# Patient Record
Sex: FEMALE | Race: WHITE | HISPANIC OR LATINO | URBAN - METROPOLITAN AREA
[De-identification: names, ages, dates, MRNs, and addresses within clinical notes are randomized per-mention and may not be internally consistent; named-entity substitution may affect disease eponyms.]

---

## 2021-12-19 ENCOUNTER — HOSPITAL ENCOUNTER (EMERGENCY)
Age: 26
Discharge: LEFT BEFORE DISCHARGE | End: 2021-12-20

## 2021-12-20 VITALS
OXYGEN SATURATION: 98 % | HEART RATE: 80 BPM | RESPIRATION RATE: 12 BRPM | TEMPERATURE: 98.2 F | DIASTOLIC BLOOD PRESSURE: 100 MMHG | SYSTOLIC BLOOD PRESSURE: 147 MMHG

## 2024-07-01 ENCOUNTER — OFFICE VISIT (OUTPATIENT)
Dept: OBGYN CLINIC | Facility: CLINIC | Age: 29
End: 2024-07-01
Payer: COMMERCIAL

## 2024-07-01 VITALS
WEIGHT: 190.31 LBS | BODY MASS INDEX: 29.87 KG/M2 | DIASTOLIC BLOOD PRESSURE: 72 MMHG | SYSTOLIC BLOOD PRESSURE: 118 MMHG | HEIGHT: 67 IN

## 2024-07-01 DIAGNOSIS — Z32.01 PREGNANCY EXAMINATION OR TEST, POSITIVE RESULT (HCC): ICD-10-CM

## 2024-07-01 DIAGNOSIS — N92.6 MISSED MENSES: Primary | ICD-10-CM

## 2024-07-01 LAB
CONTROL LINE PRESENT WITH A CLEAR BACKGROUND (YES/NO): YES YES/NO
KIT LOT #: NORMAL NUMERIC
PREGNANCY TEST, URINE: POSITIVE

## 2024-07-01 RX ORDER — CHOLECALCIFEROL (VITAMIN D3) 25 MCG
1 TABLET,CHEWABLE ORAL DAILY
COMMUNITY

## 2024-07-01 NOTE — PROGRESS NOTES
[facility]  Obstetrics and Gynecology    Subjective:     Tommy Jim is a 28 year old ,female, Patient's last menstrual period was 05/15/2024. presents with missed menses and positive pregnancy test.   This is a unplanned pregnancy. Partner is involved.  Recent contraception: none  Patient's last menstrual period was 05/15/2024. Lmp certain, regular q 36 days    Menarche: 14y (2024  3:43 PM)  Period Cycle (Days): 36 days (2024  3:43 PM)  Period Duration (Days): 4-5 (2024  3:43 PM)  Period Flow: heavy and painful (2024  3:43 PM)  Use of Birth Control (if yes, specify type): OCP (2024  3:43 PM)  Date When Birth Control Last Used: 2020 (2024  3:43 PM)  Hx Prior Abnormal Pap: No (2024  3:43 PM)  Pap Date: 24 (2024  3:43 PM)  Pap Result Notes: wnl (2024  3:43 PM)      Nausea/vomiting - mild  Breast tenderness - Positive  Vaginal discharge - Positive brown  Vaginal bleeding - Negative  Urinary symptoms - Negative  Taking prenatal vitamins  Feels safe at home   STD hx: negative 3/2024    Review of Systems:  General: no complaints  Eyes: no complaints  Respiratory: no complaints  Cardiovascular: no complaints  GI: no complaints  : no complaints See HPI  Hematological/lymphatic: no complaints  Breast: no complaints  Psychiatric: no complaints  Endocrine:no complaints  Neurological: no complaints  Immunological: no complaints  Musculoskeletal:no complaints    OB History    Para Term  AB Living   0 0 0 0 0 0   SAB IAB Ectopic Multiple Live Births   0 0 0 0 0       Past Medical History:    Patient denies medical problems       Past Surgical History:   Procedure Laterality Date    Patient denies any surgical history         Social History     Socioeconomic History    Marital status:    Tobacco Use    Smoking status: Never    Smokeless tobacco: Never   Substance and Sexual Activity    Alcohol use: Not Currently    Drug use: Never     Sexual activity: Yes   Other Topics Concern    Blood Transfusions No       Family History   Problem Relation Age of Onset    Hypertension Mother     Hyperlipidemia Mother     Diabetes Mother     Diabetes Father     Hypertension Father     No Known Problems Maternal Grandmother     No Known Problems Maternal Grandfather     No Known Problems Paternal Grandmother     No Known Problems Paternal Grandfather          Current Outpatient Medications:     prenatal vitamin with DHA 27-0.8-228 MG Oral Cap, Take 1 capsule by mouth daily., Disp: , Rfl:       Health maintenance:  Health Maintenance   Topic Date Due    Annual Physical  Never done    DTaP,Tdap,and Td Vaccines (1 - Tdap) Never done    Pap Smear  Never done    COVID-19 Vaccine (1 -  season) Never done    Annual Depression Screening  Never done    Influenza Vaccine (1) 10/01/2024    Pneumococcal Vaccine: Birth to 64yrs  Aged Out        Objective:     Vitals:    24 1545   BP: 118/72   Weight: 190 lb 4.8 oz (86.3 kg)   Height: 67\"         Body mass index is 29.81 kg/m².    GENERAL: well developed, well nourished, in no apparent distress, alert and orientated X 3  PSYCH: mood and affect stable   SKIN: no rashes, no lesions  HEENT: normal  LUNGS: respiration unlabored  CARDIOVASCULAR: no peripheral edema or varicosities, skin warm and dry    ABDOMEN: Soft, non distended; non tender, no masses  GYNE/: deferred  EXTREMITIES:  Normal range of motion, strength 5/5, nontender without edema    Labs:  POC urine pregnancy test : Positive     Imaging:  ordered       Assessment:     Tommy Jim is a 28 year old  female who presents for missed menses and positive pregnancy test    There is no problem list on file for this patient.        Plan:       ICD-10-CM    1. Missed menses  N92.6       2. Pregnancy examination or test, positive result ([facility])  Z32.01           Patient's last menstrual period was 05/15/2024. at 6/5 weeks, EDC  by last menstrual  period         Missed menses  - positive pregnancy test  - US ordered  - Pt counseled on safety, diet, exercise, caffiene, tobacco, ETOH, sexual activity, ER precautions, diet, exercise, appropriate otc medication use, substance abuse   - Counseled on taking a PNV with folic acid   - genetic screening testing d/w patient and family, pt declines invasive diagnostic testing and considering cell free DNA. Discussed possible out of pocket cost of 300$.    - considering carrier screening has had carrier screening in her past for CF, SMA, and hemoglobinopathies   - advised to follow up to establish prenatal care - referred for RN education visit  - SAB precautions provided   - d/w nausea and vomiting in pregnancy including vitamin B 6 and unisom     All of the findings and plan were discussed with the patient.  She notes understanding and agrees with the plan of care.  All questions were answered to the best of my ability at this time.    RTC in 1 weeks for ultrasound, 4 wks for RN education visit and 6 wks for NOB visit    MD ELIZABETH Olvera

## 2024-07-02 ENCOUNTER — LAB ENCOUNTER (OUTPATIENT)
Dept: LAB | Facility: REFERENCE LAB | Age: 29
End: 2024-07-02
Attending: OBSTETRICS & GYNECOLOGY
Payer: COMMERCIAL

## 2024-07-02 DIAGNOSIS — Z32.01 PREGNANCY EXAMINATION OR TEST, POSITIVE RESULT (HCC): ICD-10-CM

## 2024-07-02 DIAGNOSIS — N92.6 MISSED MENSES: ICD-10-CM

## 2024-07-02 LAB — B-HCG SERPL-ACNC: ABNORMAL MIU/ML

## 2024-07-02 PROCEDURE — 84702 CHORIONIC GONADOTROPIN TEST: CPT | Performed by: OBSTETRICS & GYNECOLOGY

## 2024-07-09 ENCOUNTER — ULTRASOUND ENCOUNTER (OUTPATIENT)
Dept: OBGYN CLINIC | Facility: CLINIC | Age: 29
End: 2024-07-09
Payer: COMMERCIAL

## 2024-07-09 DIAGNOSIS — Z32.01 PREGNANCY EXAMINATION OR TEST, POSITIVE RESULT (HCC): ICD-10-CM

## 2024-07-09 DIAGNOSIS — N92.6 MISSED MENSES: ICD-10-CM

## 2024-07-09 PROCEDURE — 76817 TRANSVAGINAL US OBSTETRIC: CPT | Performed by: OBSTETRICS & GYNECOLOGY

## 2024-07-16 ENCOUNTER — PATIENT MESSAGE (OUTPATIENT)
Dept: OBGYN CLINIC | Facility: CLINIC | Age: 29
End: 2024-07-16

## 2024-07-16 NOTE — TELEPHONE ENCOUNTER
You  Tommy Estrada now (11:11 AM)     FABIOLA Sanders,      I tried to call you to ask a few more questions. Left a voice message instead, please call.     Any concerns or additional questions, please call us at .             From: Tommy Jim  To: Ольга Nicholas  Sent: 7/16/2024 11:02 AM CDT  Subject: Morning sickness     Is there anything I can take for morning sickness, I’ve tried nicolasa drops and they don’t work, I’ve tried nicolasa tea and that also doesn’t work. Is there anything else I can take ?

## 2024-07-25 ENCOUNTER — PATIENT MESSAGE (OUTPATIENT)
Dept: OBGYN CLINIC | Facility: CLINIC | Age: 29
End: 2024-07-25

## 2024-07-26 ENCOUNTER — PATIENT MESSAGE (OUTPATIENT)
Dept: OBGYN CLINIC | Facility: CLINIC | Age: 29
End: 2024-07-26

## 2024-07-29 NOTE — TELEPHONE ENCOUNTER
From: Tommy Jim  To: Ольга Nicholas  Sent: 7/26/2024 3:29 PM CDT  Subject: Doctors note     Good evening,   The past couple of weeks have been very hard on me physical and mentally. I have been having pounding headaches everyday and not being able to hold down some foods. With all this it is hard for me to get up and go to work everyday. I am planing on asking my HR to take a leave of absence for two weeks and I was wondering if I can get a doctors note in the case that they ask for one. Looking forward to hear from you.     Thank you

## 2024-08-01 ENCOUNTER — TELEPHONE (OUTPATIENT)
Dept: FAMILY MEDICINE CLINIC | Facility: CLINIC | Age: 29
End: 2024-08-01

## 2024-08-01 NOTE — TELEPHONE ENCOUNTER
Family Medical Leave Act forms received via [employer] message on 7/30/24. [employer] message sent for Authorization/Release of Information. Logged for processing.

## 2024-08-06 ENCOUNTER — NURSE ONLY (OUTPATIENT)
Dept: OBGYN CLINIC | Facility: CLINIC | Age: 29
End: 2024-08-06
Payer: COMMERCIAL

## 2024-08-06 ENCOUNTER — LAB ENCOUNTER (OUTPATIENT)
Dept: LAB | Facility: REFERENCE LAB | Age: 29
End: 2024-08-06
Attending: OBSTETRICS & GYNECOLOGY
Payer: COMMERCIAL

## 2024-08-06 DIAGNOSIS — Z34.01 ENCOUNTER FOR SUPERVISION OF NORMAL FIRST PREGNANCY IN FIRST TRIMESTER (HCC): Primary | ICD-10-CM

## 2024-08-06 DIAGNOSIS — Z34.01 ENCOUNTER FOR SUPERVISION OF NORMAL FIRST PREGNANCY IN FIRST TRIMESTER (HCC): ICD-10-CM

## 2024-08-06 LAB
ANTIBODY SCREEN: NEGATIVE
BASOPHILS # BLD AUTO: 0.02 X10(3) UL (ref 0–0.2)
BASOPHILS NFR BLD AUTO: 0.4 %
DEPRECATED RDW RBC AUTO: 37.9 FL (ref 35.1–46.3)
EOSINOPHIL # BLD AUTO: 0.05 X10(3) UL (ref 0–0.7)
EOSINOPHIL NFR BLD AUTO: 1 %
ERYTHROCYTE [DISTWIDTH] IN BLOOD BY AUTOMATED COUNT: 12.8 % (ref 11–15)
GLUCOSE 1H P GLC SERPL-MCNC: 135 MG/DL
HBV SURFACE AG SER-ACNC: 0.19 [IU]/L
HBV SURFACE AG SERPL QL IA: NONREACTIVE
HCT VFR BLD AUTO: 35.7 %
HCV AB SERPL QL IA: NONREACTIVE
HGB BLD-MCNC: 11.9 G/DL
IMM GRANULOCYTES # BLD AUTO: 0.02 X10(3) UL (ref 0–1)
IMM GRANULOCYTES NFR BLD: 0.4 %
LYMPHOCYTES # BLD AUTO: 1.05 X10(3) UL (ref 1–4)
LYMPHOCYTES NFR BLD AUTO: 20.8 %
MCH RBC QN AUTO: 27.4 PG (ref 26–34)
MCHC RBC AUTO-ENTMCNC: 33.3 G/DL (ref 31–37)
MCV RBC AUTO: 82.1 FL
MONOCYTES # BLD AUTO: 0.34 X10(3) UL (ref 0.1–1)
MONOCYTES NFR BLD AUTO: 6.7 %
NEUTROPHILS # BLD AUTO: 3.58 X10 (3) UL (ref 1.5–7.7)
NEUTROPHILS # BLD AUTO: 3.58 X10(3) UL (ref 1.5–7.7)
NEUTROPHILS NFR BLD AUTO: 70.7 %
PLATELET # BLD AUTO: 225 10(3)UL (ref 150–450)
RBC # BLD AUTO: 4.35 X10(6)UL
RH BLOOD TYPE: POSITIVE
RUBV IGG SER QL: POSITIVE
RUBV IGG SER-ACNC: 21 IU/ML (ref 10–?)
T PALLIDUM AB SER QL IA: NONREACTIVE
WBC # BLD AUTO: 5.1 X10(3) UL (ref 4–11)

## 2024-08-06 PROCEDURE — 83021 HEMOGLOBIN CHROMOTOGRAPHY: CPT | Performed by: OBSTETRICS & GYNECOLOGY

## 2024-08-06 PROCEDURE — 86803 HEPATITIS C AB TEST: CPT | Performed by: OBSTETRICS & GYNECOLOGY

## 2024-08-06 PROCEDURE — 82950 GLUCOSE TEST: CPT | Performed by: OBSTETRICS & GYNECOLOGY

## 2024-08-06 PROCEDURE — 87086 URINE CULTURE/COLONY COUNT: CPT | Performed by: OBSTETRICS & GYNECOLOGY

## 2024-08-06 PROCEDURE — 86780 TREPONEMA PALLIDUM: CPT | Performed by: OBSTETRICS & GYNECOLOGY

## 2024-08-06 PROCEDURE — 86900 BLOOD TYPING SEROLOGIC ABO: CPT | Performed by: OBSTETRICS & GYNECOLOGY

## 2024-08-06 PROCEDURE — 85025 COMPLETE CBC W/AUTO DIFF WBC: CPT | Performed by: OBSTETRICS & GYNECOLOGY

## 2024-08-06 PROCEDURE — 86762 RUBELLA ANTIBODY: CPT | Performed by: OBSTETRICS & GYNECOLOGY

## 2024-08-06 PROCEDURE — 87389 HIV-1 AG W/HIV-1&-2 AB AG IA: CPT | Performed by: OBSTETRICS & GYNECOLOGY

## 2024-08-06 PROCEDURE — 86850 RBC ANTIBODY SCREEN: CPT | Performed by: OBSTETRICS & GYNECOLOGY

## 2024-08-06 PROCEDURE — 87340 HEPATITIS B SURFACE AG IA: CPT | Performed by: OBSTETRICS & GYNECOLOGY

## 2024-08-06 PROCEDURE — 86901 BLOOD TYPING SEROLOGIC RH(D): CPT | Performed by: OBSTETRICS & GYNECOLOGY

## 2024-08-06 PROCEDURE — 83020 HEMOGLOBIN ELECTROPHORESIS: CPT | Performed by: OBSTETRICS & GYNECOLOGY

## 2024-08-06 RX ORDER — METOCLOPRAMIDE 10 MG/1
10 TABLET ORAL EVERY 6 HOURS PRN
Qty: 20 TABLET | Refills: 0 | Status: SHIPPED | OUTPATIENT
Start: 2024-08-06

## 2024-08-06 RX ORDER — WHEAT DEXTRIN 3 G/3.8 G
3 POWDER (GRAM) ORAL DAILY
COMMUNITY
Start: 2024-03-05

## 2024-08-06 RX ORDER — HYDROCORTISONE ACETATE 25 MG/1
25 SUPPOSITORY RECTAL 3 TIMES DAILY PRN
COMMUNITY
Start: 2024-03-05

## 2024-08-06 NOTE — PROGRESS NOTES
Pt is a G 1  P  0 for RN OB Education.       Pregnancy Confirmation apt with: MA    LMP: 05/15/2024    US: 2024 at 7w    Working VAUGHN:  2025    Pre  BMI:   29.81    Medical Hx significant for: anxiety, depression    Obstetrical Hx significant for: first pregnancy    Surgical Hx significant for: denies    EPDS score: 16 Efren Brown referral, called now and visit with LC tomorrow    OUD Screening: Patient has answered NO to 5p questions and has no  risk factors.     Patient given \"What Pregnant Women Need to Know\" handout.     Educational material reviewed with patient: Prenatal care, nutrition, weight gain recommendations, travel, exercise, intercourse, pregnancy changes, safe medications, pregnancy and work, fetal movement, labor and  labor, warning signs, food safety, tdap, cord blood, breastfeeding, circumcision, and Group B strep.     Pt agrees to blood transfusion if needed: yes    PN labs ordered     Optional genetic screening discussed.  Pt desires prequel and foresight     [facility] Media Policy: Reviewed and verbalized understanding.     NOB appt: tomorrow with     Lab appt: TODAY    Vaccines: informed of flu, covid, rsv and dtap    ASA protocol :  n/a    JESSICAH:  PCP Dr. Guzman, Efren Brown

## 2024-08-07 ENCOUNTER — TELEPHONE (OUTPATIENT)
Age: 29
End: 2024-08-07

## 2024-08-07 ENCOUNTER — INITIAL PRENATAL (OUTPATIENT)
Dept: OBGYN CLINIC | Facility: CLINIC | Age: 29
End: 2024-08-07
Payer: COMMERCIAL

## 2024-08-07 VITALS
DIASTOLIC BLOOD PRESSURE: 70 MMHG | BODY MASS INDEX: 28.93 KG/M2 | WEIGHT: 184.31 LBS | HEIGHT: 67 IN | SYSTOLIC BLOOD PRESSURE: 112 MMHG

## 2024-08-07 DIAGNOSIS — Z34.01 ENCOUNTER FOR SUPERVISION OF NORMAL FIRST PREGNANCY IN FIRST TRIMESTER (HCC): Primary | ICD-10-CM

## 2024-08-07 LAB
HGB A2 MFR BLD: 2.4 % (ref 1.5–3.5)
HGB PNL BLD ELPH: 97.6 % (ref 95.5–100)

## 2024-08-07 PROCEDURE — 3078F DIAST BP <80 MM HG: CPT | Performed by: OBSTETRICS & GYNECOLOGY

## 2024-08-07 PROCEDURE — 3074F SYST BP LT 130 MM HG: CPT | Performed by: OBSTETRICS & GYNECOLOGY

## 2024-08-07 PROCEDURE — 3008F BODY MASS INDEX DOCD: CPT | Performed by: OBSTETRICS & GYNECOLOGY

## 2024-08-07 NOTE — PROGRESS NOTES
29 y/o  at 11 w  She is feeling nauseous, having difficulty keeping food down. She has lost 6 lbs in pregnancy. She was using dramamine, but was given reglan yesterday  Cell free DNA pending  She has requested paperwork for [facility] 2024

## 2024-08-08 ENCOUNTER — PATIENT MESSAGE (OUTPATIENT)
Dept: OBGYN CLINIC | Facility: CLINIC | Age: 29
End: 2024-08-08

## 2024-08-08 NOTE — TELEPHONE ENCOUNTER
From: Tommy Jim  To: Syl Patel  Sent: 8/8/2024 10:46 AM CDT  Subject: FMLA     Good Morning,     I know we had spoken yesterday about my FMLA paper work. However I spoke to my HR leave of absence coordinator and since I do want to take some time off due the way I have been feeling physically and mentally. She said that if I have a letter from your office fax to her regarding my FMLA paper work in the process. She will be able to tiffani me time off. Saying the following “ Patient is under their care and medical certificates is pending and will be faxed to you soon   However please allow the patient of work for approx two weeks with a date”   [phone]

## 2024-08-08 NOTE — PROGRESS NOTES
Pt contacted,  and name verified. Pt provided lab result and msg from provider. RN provided test instructions and sent via [employer]. RN sked pt for 3-hr GTT on  at [facility]. Pt verbalized understanding and agreed with plan of care.

## 2024-08-12 ENCOUNTER — PATIENT MESSAGE (OUTPATIENT)
Dept: OBGYN CLINIC | Facility: CLINIC | Age: 29
End: 2024-08-12

## 2024-08-12 NOTE — TELEPHONE ENCOUNTER
Dr. Patel, Syl MONSIVAIS MD,    Patient is seeking continuous Family Medical Leave Act due to nausea and headaches. Start date 08/12/24-08/26/24. Do you support?    Thanks,    Yesika

## 2024-08-12 NOTE — TELEPHONE ENCOUNTER
Patient called to check status, adv still in line for processing patient stats is due 08/14/2024 asked that forms be processed as STAT and gathered the below;    Type of Leave: Continuous Family Medical Leave Act   Reason for Leave: Maternity   Start date of leave: 08/12/2024  How much time needed?: 2 wks Return to work 08/26/2024  Forms Due Date:08/14/2024- STAT   Was Fee and Turnaround info Given?: Yes     Adv patient on next steps, patient expressed understanding and knows forms will be processed as stat.

## 2024-08-12 NOTE — TELEPHONE ENCOUNTER
From: Tommy Jim  To: Ольга Nicholas  Sent: 8/12/2024 10:25 AM CDT  Subject: FMLA paper work     Good Morning,   I was just reaching out to see when my FMLA paper work will be fill out and faxed to my job. I just want to make sure it will make it to the dead line which in the 14th of this month. Looking forward to hearing from you.     Best Regards

## 2024-08-13 NOTE — TELEPHONE ENCOUNTER
Patient called for status, informed we are awaiting provider response.  Patient states form due date 8/14/24.  Delay letter sent to [employer].

## 2024-08-14 NOTE — TELEPHONE ENCOUNTER
Patient is calling requesting update on forms being signed deadline is due today. Please assist.

## 2024-08-14 NOTE — TELEPHONE ENCOUNTER
The patient called and stated that the forms department told her to call the office. Her forms were sent to Dr. Greenwood to be signed. Today is the deadline for the forms. She wanted to know if they were ready.

## 2024-08-14 NOTE — TELEPHONE ENCOUNTER
Please try to avoid signing forms in the corner as it is not visible when printing and forms are not accepted this way. Thank you!     Dr. Patel,       *The ACKNOWLEDGE button has been moved to the top right ribbon*    Please sign off on form if you agree to: Family Medical Leave Act 2 wks 08/12/2024-08/25/2024 w/ Return to work 08/26/2024  (place your signature on the first page only)    -From your Inbasket, Highlight the patient and click Chart   -Double click the 08/01/2024 Forms Completion telephone encounter  -Scroll down to the Media section   -Click the blue Hyperlink: Family Medical Leave Act Dr. Syl Patel 08/14/2024  -Click Acknowledge located in the top right ribbon/menu   -Drag the mouse into the blank space of the document and a + sign will appear. Left click to   electronically sign the document.     Thank you,  Payton HARRIS

## 2024-08-15 RX ORDER — METOCLOPRAMIDE 10 MG/1
10 TABLET ORAL EVERY 6 HOURS PRN
Qty: 30 TABLET | Refills: 0 | Status: SHIPPED | OUTPATIENT
Start: 2024-08-15 | End: 2024-08-28

## 2024-08-15 NOTE — TELEPHONE ENCOUNTER
Family Medical Leave Act forms faxed to Vivek MILLER/ KENRICK JEREZ @ [phone], sending MyChart to Adv patient. Archiving forms.

## 2024-08-16 ENCOUNTER — TELEPHONE (OUTPATIENT)
Dept: OBGYN CLINIC | Facility: CLINIC | Age: 29
End: 2024-08-16

## 2024-08-16 ENCOUNTER — MED REC SCAN ONLY (OUTPATIENT)
Dept: OBGYN CLINIC | Facility: CLINIC | Age: 29
End: 2024-08-16

## 2024-08-16 NOTE — TELEPHONE ENCOUNTER
Prequel normal and gender TBA    Called pt informed of normal prequel and gender in envelope.  Pt agrees.

## 2024-08-17 ENCOUNTER — LABORATORY ENCOUNTER (OUTPATIENT)
Dept: LAB | Facility: HOSPITAL | Age: 29
End: 2024-08-17
Attending: OBSTETRICS & GYNECOLOGY
Payer: COMMERCIAL

## 2024-08-22 ENCOUNTER — TELEPHONE (OUTPATIENT)
Dept: OBGYN CLINIC | Facility: CLINIC | Age: 29
End: 2024-08-22

## 2024-08-22 NOTE — TELEPHONE ENCOUNTER
Patient is calling requesting to speak to RN regarding a return to work note start for this upcoming Monday 8/22/2024 due to being off for two weeks. Please follow up with patient.

## 2024-08-28 RX ORDER — METOCLOPRAMIDE 10 MG/1
10 TABLET ORAL EVERY 6 HOURS PRN
Qty: 30 TABLET | Refills: 0 | Status: SHIPPED | OUTPATIENT
Start: 2024-08-28

## 2024-09-06 ENCOUNTER — TELEPHONE (OUTPATIENT)
Dept: OBGYN CLINIC | Facility: CLINIC | Age: 29
End: 2024-09-06

## 2024-09-06 PROBLEM — Z14.8 GENETIC CARRIER STATUS: Status: ACTIVE | Noted: 2024-09-06

## 2024-09-06 NOTE — TELEPHONE ENCOUNTER
Foresight carrier screen:  Congenital Adrenal Hyperplasia, [id number]  Alpha Thalassemia, HBA1/HBA2-related    LMTCB; emailed report from [facility]

## 2024-09-08 PROBLEM — Z34.00: Status: ACTIVE | Noted: 2024-08-07

## 2024-09-09 ENCOUNTER — ROUTINE PRENATAL (OUTPATIENT)
Dept: OBGYN CLINIC | Facility: CLINIC | Age: 29
End: 2024-09-09
Payer: COMMERCIAL

## 2024-09-09 ENCOUNTER — LAB ENCOUNTER (OUTPATIENT)
Dept: LAB | Facility: REFERENCE LAB | Age: 29
End: 2024-09-09
Attending: OBSTETRICS & GYNECOLOGY
Payer: COMMERCIAL

## 2024-09-09 VITALS
DIASTOLIC BLOOD PRESSURE: 72 MMHG | SYSTOLIC BLOOD PRESSURE: 114 MMHG | HEIGHT: 67 IN | BODY MASS INDEX: 28.61 KG/M2 | WEIGHT: 182.31 LBS

## 2024-09-09 DIAGNOSIS — Z34.00 ENCNTR FOR SUPRVSN OF NORMAL FIRST PREGNANCY, UNSP TRIMESTER (HCC): Primary | ICD-10-CM

## 2024-09-09 PROCEDURE — 82105 ALPHA-FETOPROTEIN SERUM: CPT | Performed by: OBSTETRICS & GYNECOLOGY

## 2024-09-09 RX ORDER — FAMOTIDINE 20 MG/1
20 TABLET, FILM COATED ORAL 2 TIMES DAILY PRN
Qty: 20 TABLET | Refills: 1 | Status: SHIPPED | OUTPATIENT
Start: 2024-09-09

## 2024-09-09 NOTE — PROGRESS NOTES
Denies pain, bleeding, LOF.   No fetal movement yet   28 year old  at 15w6d      Return OB  Pre- Care: UTD. AFP and ultrasound orders placed.  Reviewed positive carrier screen - desires partner testing today.      Patient Active Problem List   Diagnosis    Encntr for suprvsn of normal first pregnancy, unsp trimester (HCC)    Genetic carrier status - Congenital Adrenal Hyperplasia, [id number] Alpha Thalassemia, HBA1/HBA2-related; partner testing sent        - Return to clinic in: 4

## 2024-09-11 NOTE — TELEPHONE ENCOUNTER
Called pt informed of partner testing, [employer] genetics counselor.  Pt agrees.    Per pt was made aware of partner needing carrier screening and it was done on Monday.

## 2024-09-13 LAB
AFP MOM: 1.3
AFP VALUE: 37.5 NG/ML
GA ON COLL DATE: 15.9 WEEKS
INSULIN DEP AFP: NO
MAT AGE AT EDD: 30.3 YR
MULTIPLE GEST AFP: NO
OSBR RISK 1 IN AFP: 4803
WEIGHT AFP: 182 LBS

## 2024-09-25 ENCOUNTER — TELEPHONE (OUTPATIENT)
Dept: OBGYN CLINIC | Facility: CLINIC | Age: 29
End: 2024-09-25

## 2024-09-25 NOTE — TELEPHONE ENCOUNTER
Congenital Adrenal Hyperplasia, [id number] Partner negative- 2024  Alpha Thalassemia, HBA1/HBA2-related Partner negative- 2024    Called pt informed of partner testing negative and agrees.

## 2024-10-07 ENCOUNTER — ROUTINE PRENATAL (OUTPATIENT)
Dept: OBGYN CLINIC | Facility: CLINIC | Age: 29
End: 2024-10-07
Payer: COMMERCIAL

## 2024-10-07 ENCOUNTER — ULTRASOUND ENCOUNTER (OUTPATIENT)
Dept: OBGYN CLINIC | Facility: CLINIC | Age: 29
End: 2024-10-07
Payer: COMMERCIAL

## 2024-10-07 VITALS
BODY MASS INDEX: 30.13 KG/M2 | HEIGHT: 67 IN | DIASTOLIC BLOOD PRESSURE: 82 MMHG | WEIGHT: 192 LBS | SYSTOLIC BLOOD PRESSURE: 118 MMHG

## 2024-10-07 DIAGNOSIS — Z34.00 SUPERVISION OF NORMAL FIRST PREGNANCY, ANTEPARTUM (HCC): Primary | ICD-10-CM

## 2024-10-07 DIAGNOSIS — Z34.02 ENCOUNTER FOR SUPERVISION OF NORMAL FIRST PREGNANCY IN SECOND TRIMESTER (HCC): Primary | ICD-10-CM

## 2024-10-07 NOTE — PROGRESS NOTES
Denies pain, bleeding, LOF.   No fetal movement yet     28 year old  at 19w6d      Return OB  Pre-Clemente Care: UTD.   - anatomy US today WNL  - flu shot today      Patient Active Problem List   Diagnosis    Encntr for suprvsn of normal first pregnancy, unsp trimester (HCC)    Carrier of Congenital Adrenal Hyperplasia, [id number] Alpha Thalassemia, HBA1/HBA2-related; partner is negative       - Return to clinic in: 4 wks    Rhonda Felix,

## 2024-11-06 ENCOUNTER — ROUTINE PRENATAL (OUTPATIENT)
Dept: OBGYN CLINIC | Facility: CLINIC | Age: 29
End: 2024-11-06
Payer: COMMERCIAL

## 2024-11-06 VITALS
SYSTOLIC BLOOD PRESSURE: 108 MMHG | HEIGHT: 67 IN | DIASTOLIC BLOOD PRESSURE: 60 MMHG | BODY MASS INDEX: 31.23 KG/M2 | WEIGHT: 199 LBS

## 2024-11-06 DIAGNOSIS — O99.810 ABNORMAL MATERNAL GLUCOSE TOLERANCE, ANTEPARTUM (HCC): Primary | ICD-10-CM

## 2024-11-06 PROCEDURE — 3074F SYST BP LT 130 MM HG: CPT | Performed by: OBSTETRICS & GYNECOLOGY

## 2024-11-06 PROCEDURE — 3078F DIAST BP <80 MM HG: CPT | Performed by: OBSTETRICS & GYNECOLOGY

## 2024-11-06 PROCEDURE — 3008F BODY MASS INDEX DOCD: CPT | Performed by: OBSTETRICS & GYNECOLOGY

## 2024-11-06 NOTE — PROGRESS NOTES
RETURN OB VISIT    Tommy Jim is a 29 year old  at 24w1d presenting for return OB visit. Today she reports no contractions, vaginal bleeding or leaking fluid. Starting to feel fetal movement. Some bilateral ankle swelling yesterday but reports did not walk very much. Improved with elevating her feet overnight. No asymmetry or tenderness on exam, now suspicion for VTE.     Repeat 3hr GTT ordered, will complete when fasting    Follow up in 4wk    Era Sanchez DO

## 2024-12-07 ENCOUNTER — LAB ENCOUNTER (OUTPATIENT)
Dept: LAB | Facility: HOSPITAL | Age: 29
End: 2024-12-07
Attending: OBSTETRICS & GYNECOLOGY
Payer: COMMERCIAL

## 2024-12-07 DIAGNOSIS — O99.810 ABNORMAL MATERNAL GLUCOSE TOLERANCE, ANTEPARTUM (HCC): Primary | ICD-10-CM

## 2024-12-07 LAB
GLUCOSE 1H P GLC SERPL-MCNC: 144 MG/DL
GLUCOSE 2H P GLC SERPL-MCNC: 119 MG/DL
GLUCOSE 3H P GLC SERPL-MCNC: 97 MG/DL (ref 70–140)
GLUCOSE P FAST SERPL-MCNC: 79 MG/DL

## 2024-12-07 PROCEDURE — 82951 GLUCOSE TOLERANCE TEST (GTT): CPT | Performed by: OBSTETRICS & GYNECOLOGY

## 2024-12-07 PROCEDURE — 82952 GTT-ADDED SAMPLES: CPT | Performed by: OBSTETRICS & GYNECOLOGY

## 2024-12-09 ENCOUNTER — ROUTINE PRENATAL (OUTPATIENT)
Dept: OBGYN CLINIC | Facility: CLINIC | Age: 29
End: 2024-12-09
Payer: COMMERCIAL

## 2024-12-09 VITALS
WEIGHT: 204 LBS | HEIGHT: 67 IN | DIASTOLIC BLOOD PRESSURE: 68 MMHG | BODY MASS INDEX: 32.02 KG/M2 | SYSTOLIC BLOOD PRESSURE: 108 MMHG

## 2024-12-09 DIAGNOSIS — Z34.03 ENCOUNTER FOR SUPERVISION OF NORMAL FIRST PREGNANCY IN THIRD TRIMESTER (HCC): Primary | ICD-10-CM

## 2024-12-09 DIAGNOSIS — N89.8 VAGINAL DISCHARGE: ICD-10-CM

## 2024-12-09 PROCEDURE — 81514 NFCT DS BV&VAGINITIS DNA ALG: CPT | Performed by: OBSTETRICS & GYNECOLOGY

## 2024-12-09 NOTE — PROGRESS NOTES
Doing well. No OB complaints. +FM.   Noted having increased swelling and back pains after working around the home and took a day or 2 off of work. Agreed with pregnancy related intermittent leave for pregnancy related symptoms for 1-2 days per month.   Letter provided.   Noted abnormal vaginal odor.   Recommend Vaginitis swab. Collected with MA chaperone present.   TDAP today.   Passed 3h GTT.   Reviewed baby mindfulness for fetal movements. Call if changes/present to OB triage.     JON 2 weeks.     Dr. Joseluis Rodriguez MD    [facility]     This note was created by [employer] voice recognition. Errors in content may be related to improper recognition by the system; efforts to review and correct have been done but errors may still exist. Please be advised the primary purpose of this note is for me to communicate medical care. Standard sentence structure is not always used. Medical terminology and medical abbreviations may be used. There may be grammatical, typographical, and automated fill ins that may have errors missed in proofreading.

## 2024-12-10 LAB
BV BACTERIA DNA VAG QL NAA+PROBE: NEGATIVE
C GLABRATA DNA VAG QL NAA+PROBE: NEGATIVE
C KRUSEI DNA VAG QL NAA+PROBE: NEGATIVE
CANDIDA DNA VAG QL NAA+PROBE: NEGATIVE
T VAGINALIS DNA VAG QL NAA+PROBE: NEGATIVE

## 2024-12-24 ENCOUNTER — ROUTINE PRENATAL (OUTPATIENT)
Dept: OBGYN CLINIC | Facility: CLINIC | Age: 29
End: 2024-12-24
Payer: COMMERCIAL

## 2024-12-24 VITALS
DIASTOLIC BLOOD PRESSURE: 78 MMHG | SYSTOLIC BLOOD PRESSURE: 108 MMHG | BODY MASS INDEX: 31.86 KG/M2 | WEIGHT: 203 LBS | HEIGHT: 67 IN

## 2024-12-24 DIAGNOSIS — Z34.00 ENCNTR FOR SUPRVSN OF NORMAL FIRST PREGNANCY, UNSP TRIMESTER (HCC): Primary | ICD-10-CM

## 2024-12-24 PROCEDURE — 3078F DIAST BP <80 MM HG: CPT | Performed by: OBSTETRICS & GYNECOLOGY

## 2024-12-24 PROCEDURE — 3074F SYST BP LT 130 MM HG: CPT | Performed by: OBSTETRICS & GYNECOLOGY

## 2024-12-24 PROCEDURE — 3008F BODY MASS INDEX DOCD: CPT | Performed by: OBSTETRICS & GYNECOLOGY

## 2024-12-24 NOTE — PROGRESS NOTES
at 31 W  Check usn at 32 W  DW her benefits of TDAP  Had episode where she was seeing spots on L eye last weekend. Recommend call if recurs.   Has to walk around a lot at her job and would like restrictions Letter generated

## 2025-01-06 ENCOUNTER — HOSPITAL ENCOUNTER (OUTPATIENT)
Dept: PERINATAL CARE | Facility: HOSPITAL | Age: 30
Discharge: HOME | End: 2025-01-06
Attending: OBSTETRICS & GYNECOLOGY
Payer: COMMERCIAL

## 2025-01-06 ENCOUNTER — HOSPITAL ENCOUNTER (OUTPATIENT)
Facility: HOSPITAL | Age: 30
Setting detail: OBSERVATION
Discharge: HOME | End: 2025-01-08
Attending: OBSTETRICS & GYNECOLOGY | Admitting: OBSTETRICS & GYNECOLOGY
Payer: COMMERCIAL

## 2025-01-06 ENCOUNTER — PATIENT MESSAGE (OUTPATIENT)
Dept: OBGYN CLINIC | Facility: CLINIC | Age: 30
End: 2025-01-06

## 2025-01-06 DIAGNOSIS — N63.21 LUMP IN UPPER OUTER QUADRANT OF LEFT BREAST: Primary | ICD-10-CM

## 2025-01-06 DIAGNOSIS — O09.219 HX OF PTL (PRETERM LABOR), CURRENT PREGNANCY (HCC): Primary | ICD-10-CM

## 2025-01-06 LAB
BILIRUB UR QL: NEGATIVE
COLOR UR: YELLOW
GLUCOSE UR-MCNC: NORMAL MG/DL
KETONES UR-MCNC: NEGATIVE MG/DL
LEUKOCYTE ESTERASE UR QL STRIP.AUTO: 250
NITRITE UR QL STRIP.AUTO: NEGATIVE
PH UR: 6.5 [PH] (ref 5–8)
PROT UR-MCNC: 20 MG/DL
RBC #/AREA URNS AUTO: >10 /HPF
SP GR UR STRIP: 1.02 (ref 1–1.03)
UROBILINOGEN UR STRIP-ACNC: NORMAL

## 2025-01-06 PROCEDURE — 76819 FETAL BIOPHYS PROFIL W/O NST: CPT

## 2025-01-06 PROCEDURE — 99214 OFFICE O/P EST MOD 30 MIN: CPT | Performed by: OBSTETRICS & GYNECOLOGY

## 2025-01-06 PROCEDURE — 76818 FETAL BIOPHYS PROFILE W/NST: CPT

## 2025-01-06 PROCEDURE — 76816 OB US FOLLOW-UP PER FETUS: CPT | Performed by: OBSTETRICS & GYNECOLOGY

## 2025-01-06 RX ORDER — BETAMETHASONE SODIUM PHOSPHATE AND BETAMETHASONE ACETATE 3; 3 MG/ML; MG/ML
12 INJECTION, SUSPENSION INTRA-ARTICULAR; INTRALESIONAL; INTRAMUSCULAR; SOFT TISSUE EVERY 24 HOURS
Status: COMPLETED | OUTPATIENT
Start: 2025-01-06 | End: 2025-01-07

## 2025-01-06 RX ORDER — SODIUM CHLORIDE, SODIUM LACTATE, POTASSIUM CHLORIDE, CALCIUM CHLORIDE 600; 310; 30; 20 MG/100ML; MG/100ML; MG/100ML; MG/100ML
INJECTION, SOLUTION INTRAVENOUS CONTINUOUS
Status: DISCONTINUED | OUTPATIENT
Start: 2025-01-06 | End: 2025-01-08

## 2025-01-06 RX ORDER — BETAMETHASONE SODIUM PHOSPHATE AND BETAMETHASONE ACETATE 3; 3 MG/ML; MG/ML
INJECTION, SUSPENSION INTRA-ARTICULAR; INTRALESIONAL; INTRAMUSCULAR; SOFT TISSUE
Status: COMPLETED
Start: 2025-01-06 | End: 2025-01-06

## 2025-01-06 NOTE — PROGRESS NOTES
Pt is a 29 year old female admitted to TR3/TR3-A.     Chief Complaint   Patient presents with    Vaginal Bleeding     0900 and cramping at 0800 that has gotten less severe per patient. Bleeding noted on pts underwear and pt states bright red blood when wiping. +FM and denies LOF. No tenderness to abdominal palpation      Pt is  32w6d intra-uterine pregnancy.  History obtained, consents signed. Oriented to room, staff, and plan of care.

## 2025-01-06 NOTE — PROGRESS NOTES
Pt is a 29 year old female admitted to 375/375-A.     Chief Complaint   Patient presents with    Vaginal Bleeding     0900 and cramping at 0800 that has gotten less severe per patient. Bleeding noted on pts underwear and pt states bright red blood when wiping. +FM and denies LOF. No tenderness to abdominal palpation      Pt is  32w6d intra-uterine pregnancy.  History obtained, consents signed. Oriented to room, staff, and plan of care.

## 2025-01-06 NOTE — CONSULTS
[facility]  Non-Surgical Consult    Tommy Jim Patient Status:  Outpatient    10/14/1995 MRN [id number]   Location [facility] Attending Syl Patel*   Hosp Day # 0 PCP No primary care provider on file.     SUBJECTIVE:  Reason for Admission:   contractions and vaginal bleeding    History of Present Illness:  Patient is a 29 year old female.    Patient's last menstrual period was 05/15/2024.  Woke up with spotting of blood this morning and starting having regular cramping.  Denies prenatal complications.  No trauma.  Was examined by Dr. Greenwood and dilated 1 cm.    Medications:  Prescriptions Prior to Admission[1]    Allergies:  Allergies[2]     Past Medical History:  Past Medical History:    Anxiety    Depression    Patient denies medical problems       Past Surgical History:  Past Surgical History:   Procedure Laterality Date    Patient denies any surgical history         Past OB History:  OB History    Para Term  AB Living   1 0 0 0 0 0   SAB IAB Ectopic Multiple Live Births   0 0 0 0 0      # Outcome Date GA Lbr Gume/2nd Weight Sex Type Anes PTL Lv   1 Current                    Social History:  Social History     Tobacco Use    Smoking status: Never    Smokeless tobacco: Never   Substance Use Topics    Alcohol use: Not Currently        Review of Systems:  Unremarkable except as above    OBJECTIVE:  Temp:  [98 °F (36.7 °C)-98.2 °F (36.8 °C)] 98.2 °F (36.8 °C)  Pulse:  [83-88] 83  Resp:  [16] 16  BP: (124-136)/(79-82) 136/82  No intake or output data in the 24 hours ending 25 1507    Physical Exam:  General appearance: alert, appears stated age, cooperative, and no distress  Heart: regular rate and rhythm  Abdomen: soft, non-tender; bowel sounds normal; no masses,  no organomegaly  irregular contractions  Extremities: extremities normal, atraumatic, no cyanosis or edema  Neurologic: Grossly normal    Diagnostics:    OB ULTRASOUND  REPORT   See imaging tab for complete ultrasound report or in PACS    Single IUP in cephalic presentation.    Placenta is anterior.   A 3 vessel cord is noted.  Cardiac activity is present at 138 bpm  EFW 2736 g ( 6 lb 1 oz); 85%.    MVP is 3.9 cm . VINCENT 11.4 cm        BIOPHYSICAL PROFILE:  Movement:    2/2  Tone:            2/2  Breathin/2  Fluid:             2/2  TOTAL:             ==================================================    DISCUSSION  During her visit we discussed and reviewed the following issues:    We discussed the morbidity and mortality associated with prematurity at various gestational ages.  The signs and symptoms of  labor were discussed.  We talked about potential risks and benefits associated with tocolytic therapy and  steroid.       ASSESSMENT:  IUP at 32w6d    labor    PLAN:  Agree with current plan of IV hydration, Betamethasone and antibiotics.  If contractions continue or change in cervix then consider tocolysis.      Thank you for allowing me to participate in the care of your patient.  Please do not hesitate to call with any questions or concerns.     Total floor time was 45 minutes in evaluation, consultation, and coordination of care.  Greater than 50% of this time was spent in face to face discussion with the patient.    Discussed with Dr. Greenwood.    Neptali Elaine D.O.  2025  3:07 PM           [1]   Medications Prior to Admission   Medication Sig Dispense Refill Last Dose/Taking    prenatal vitamin with DHA 27-0.8-228 MG Oral Cap Take 1 capsule by mouth daily.   2025    famotidine (PEPCID AC MAXIMUM STRENGTH) 20 MG Oral Tab Take 1 tablet (20 mg total) by mouth 2 (two) times daily as needed for Heartburn. 20 tablet 1     metoclopramide (REGLAN) 10 MG Oral Tab Take 1 tablet (10 mg total) by mouth every 6 (six) hours as needed (nausea and/or vomiting). (Patient not taking: Reported on 2024) 30 tablet 0     hydrocortisone 2.5 % External  Cream Place rectally.       hydrocortisone 25 MG Rectal Suppos Place 1 suppository (25 mg total) rectally 3 (three) times daily as needed.       Wheat Dextrin (BENEFIBER) Oral Powder Take 3 g by mouth daily.      [2] No Known Allergies

## 2025-01-06 NOTE — H&P
GYN H&P     2025  12:14 PM    CC: Patient is here for painful contractions    HPI: Patient is a 29 year old  for painful contractions with light vaginal bleeding starting this AM.     + FM, no LOF. No urinary c/o    Pregnancy uncomplicated.         Patient's last menstrual period was 05/15/2024.    OB History    Para Term  AB Living   1 0 0 0 0 0   SAB IAB Ectopic Multiple Live Births   0 0 0 0 0      # Outcome Date GA Lbr Gume/2nd Weight Sex Type Anes PTL Lv   1 Current                GYN hx:           Past Medical History:    Anxiety    Depression    Patient denies medical problems     Past Surgical History:   Procedure Laterality Date    Patient denies any surgical history       Allergies[1]  Family History   Problem Relation Age of Onset    Diabetes Father     Hypertension Father     Hypertension Mother     Hyperlipidemia Mother     Diabetes Mother     No Known Problems Maternal Grandmother     No Known Problems Maternal Grandfather     No Known Problems Paternal Grandmother     No Known Problems Paternal Grandfather     No Known Problems Brother     Depression Sister      Social History     Socioeconomic History    Marital status:    Tobacco Use    Smoking status: Never    Smokeless tobacco: Never   Substance and Sexual Activity    Alcohol use: Not Currently    Drug use: Never    Sexual activity: Yes     Social History     Social History Narrative    Feel safe       Medications reviewed. See active list.     /79 (BP Location: Left arm)   Pulse 88   Temp 98 °F (36.7 °C) (Oral)   Resp 16   Wt 204 lb (92.5 kg)   LMP 05/15/2024   BMI 31.95 kg/m²       Exam:   FHT's 150 category 1  Ucx 1 2 - 3  GENERAL: well developed, well nourished, in no apparent distress  SKIN: no rashes, no suspicious lesions  ABDOMEN: gravod  GYNE/:  External Genitalia: Normal appearing, no lesions, normal hair distribution   Urethral meatus appear wnl, no abnormal discharge or lesions noted.    Bladder: well supported, urethra wnl, no palpable tenderness or masses, no discharge, no fluid, no blood in vault  Vagina: normal pink mucosa, no lesions, normal clear discharge.   Cervix: pink, no lesions grossly visible, no discharge  SVE 50/-3    Component      Latest Ref Rng 2025   Color Urine      Yellow  Yellow    Clarity Urine      Clear  Turbid !    Spec Gravity      1.005 - 1.030  1.020    Glucose Urine      Normal mg/dL Normal    Bilirubin Urine      Negative  Negative    Ketones, UA      Negative mg/dL Negative    Blood Urine      Negative  3+ !    PH Urine      5.0 - 8.0  6.5    Protein Urine      Negative mg/dL 20 !    Urobilinogen Urine      Normal  Normal    Nitrite Urine      Negative  Negative    Leukocyte Esterase       Negative  250 !    WBC Urine      0 - 5 /HPF 6-10 !    RBC Urine      0 - 2 /HPF >10 !    Bacteria Urine      None Seen /HPF 1+ !    SQUAM EPI CELLS UR      None Seen /HPF Few !    RENAL TUBULAR EPITHELIAL CELLS      None Seen /HPF None Seen    TRANSITIONAL EPI CELLS      None Seen /HPF None Seen    YEAST URINE      None Seen /HPF None Seen       Legend:  ! Abnormal    A/P: Patient is 29 year old female  at 32 6/7 w with  labor. Will begin IV hydration and steroids. May require tocolysis. Will consult [facility]    UTI - start ancef.     Syl Patel MD            [1] No Known Allergies

## 2025-01-06 NOTE — TELEPHONE ENCOUNTER
Called pt c/o period cramping today started at 8am, PS 4-5/10 cramps, specks turning to spots. Based upon informed advised to go to [facility].  Pt agrees.    Paged Dr. Greenwood and informed    Called [facility], report to Annel TY

## 2025-01-07 ENCOUNTER — TELEPHONE (OUTPATIENT)
Dept: OBGYN CLINIC | Facility: CLINIC | Age: 30
End: 2025-01-07

## 2025-01-07 LAB
BV BACTERIA DNA VAG QL NAA+PROBE: NEGATIVE
C GLABRATA DNA VAG QL NAA+PROBE: NEGATIVE
C KRUSEI DNA VAG QL NAA+PROBE: NEGATIVE
C TRACH DNA SPEC QL NAA+PROBE: NEGATIVE
CANDIDA DNA VAG QL NAA+PROBE: NEGATIVE
N GONORRHOEA DNA SPEC QL NAA+PROBE: NEGATIVE
T VAGINALIS DNA VAG QL NAA+PROBE: NEGATIVE

## 2025-01-07 NOTE — PROGRESS NOTES
[facility] ([place])  OB/GYN: Antepartum Progress Note     SUBJECTIVE:  Pt is a 29 year old  female at 33w0d who was admitted on 2025 for  contractions.     feels better today.  Fetal Movement: regular. Vaginal Bleeding: some light pink discharge. Contractions: rare. Leakage of Fluid: denies    OBJECTIVE:  Vital signs in last 24 hours:  Temp:  [97.9 °F (36.6 °C)-98.8 °F (37.1 °C)] 98.2 °F (36.8 °C)  Pulse:  [83-95] 95  Resp:  [16] 16  BP: (109-136)/(56-82) 115/56  Temps:   Temp Readings from Last 3 Encounters:   25 98.2 °F (36.8 °C) (Oral)     Maximum Temperature: Temp (24hrs), Av.2 °F (36.8 °C), Min:97.9 °F (36.6 °C), Max:98.8 °F (37.1 °C)     BPs:  BP Readings from Last 3 Encounters:   25 115/56   24 108/78   24 108/68     Weights:  Wt Readings from Last 3 Encounters:   25 204 lb (92.5 kg)   24 203 lb (92.1 kg)   24 204 lb (92.5 kg)       Intake/Output:  Totals for last 24 hours:   No intake or output data in the 24 hours ending 25 1053    Uterus: nontender  Extremities: SCDs in place, no calf tenderness bilaterally    Uterine Contraction: irregular but q 8-12 minutes   FHT: reactive NST    Cervix: /50-3      ASSESSMENT/PLAN:  Pt is a 29 year old  female at 33w0d who was admitted on 2025 for  contractions.   Hospital Day 0    Active Problems:  Patient Active Problem List   Diagnosis    Encntr for suprvsn of normal first pregnancy, unsp trimester (HCC)    Carrier of Congenital Adrenal Hyperplasia, [id number] Alpha Thalassemia, HBA1/HBA2-related; partner is negative      contractions:  - continues to have contractions, not painful  - cervix unchanged from admisssion  - MFM on consult.  - 2nd dose of BTMZ @ noon today. Consider DC home tomorrow if no additional cervical change.    UTI  - s/p ancef yesterday, should treat UTI in pregnancy.      Rhonda Felix, DO

## 2025-01-07 NOTE — TELEPHONE ENCOUNTER
Received incomplete Family Medical Leave Act form via email and a valid Release of Information form for [place] Leave of Absence signed on 12/24/24. Scanned Release of Information into patients chart. Logged for processing.

## 2025-01-07 NOTE — TELEPHONE ENCOUNTER
Received another email with incomplete Family Medical Leave Act forms. Moved to filling cabinet box.

## 2025-01-08 VITALS
WEIGHT: 204 LBS | DIASTOLIC BLOOD PRESSURE: 62 MMHG | HEART RATE: 98 BPM | HEIGHT: 67 IN | RESPIRATION RATE: 16 BRPM | SYSTOLIC BLOOD PRESSURE: 108 MMHG | TEMPERATURE: 99 F | BODY MASS INDEX: 32.02 KG/M2

## 2025-01-08 PROBLEM — O47.00 PRETERM CONTRACTIONS (HCC): Status: ACTIVE | Noted: 2025-01-08

## 2025-01-08 RX ORDER — HYDROCORTISONE 25 MG/G
1 CREAM TOPICAL DAILY
Qty: 28 G | Refills: 1 | Status: SHIPPED | OUTPATIENT
Start: 2025-01-08

## 2025-01-08 NOTE — DISCHARGE SUMMARY
[facility]  part of [facility]    Discharge Summary    Tommy Jim Patient Status:  Outpatient    10/14/1995 MRN [id number]   Location [facility] Attending Joseluis Rodriguez MD   Hosp Day # 0 PCP No primary care provider on file.     Admit Date: 2025    Discharge Date : 25    Attending Physician: Joseluis Rodriguez MD    Reason for Admission:   contractions.     Procedures:  None.   Given IM betamethasone course on  and .     Hospital Course: Patient was admitted with painful contractions. SVE was 1/70/-2. She was started on antibiotics and given a betamethasone course. Contractions stopped. SVE was unchanged on  and . She finished the betamethasone course receiving 2 doses. She was stable for discharge home on 25 after noting no contractions and her SVE was unchanged at 1/70/-3.       Discharge Diagnoses: There were no encounter diagnoses.    Discharged Condition: good    Disposition: home    Patient Instructions:  Follow-up appointment with Dr. Rodriguez in 2 weeks.    Joseluis Rodriguez MD  2025  8:50 AM

## 2025-01-08 NOTE — PROGRESS NOTES
[facility] ([place])  OB/GYN: Antepartum Progress Note     SUBJECTIVE:  Pt is a 29 year old  female at 33w1d who was admitted on 2025 for  contractions. Patient reports feeling well. No contractions or tightness. Feels one side of her uterus where the baby is resting as harder.     Fetal Movement: +. Vaginal Bleeding: no. Contractions: no. Leakage of Fluid: no    Feels like she has a new lump around her anus.     Also feels a new lump in her left breast upper outer quadrant.     OBJECTIVE:  Vital signs in last 24 hours:  Temp:  [98.2 °F (36.8 °C)-98.9 °F (37.2 °C)] 98.6 °F (37 °C)  Pulse:  [] 86  Resp:  [16-18] 16  BP: (123-134)/(59-72) 124/59  Temps:   Temp Readings from Last 3 Encounters:   25 98.6 °F (37 °C) (Oral)     Maximum Temperature: Temp (24hrs), Av.5 °F (36.9 °C), Min:98.2 °F (36.8 °C), Max:98.9 °F (37.2 °C)     BPs:  BP Readings from Last 3 Encounters:   25 124/59   24 108/78   24 108/68     Weights:  Wt Readings from Last 3 Encounters:   25 204 lb (92.5 kg)   24 203 lb (92.1 kg)   24 204 lb (92.5 kg)       Intake/Output:  Totals for last 24 hours:   No intake or output data in the 24 hours ending 25 0754    Uterus: nontender  Extremities: No calf tenderness bilaterally    Last NST was 25 at 0300:   Uterine Contraction: noted 1 contraction in 30 min.   NST:baseline 125/ moderate bryan/ + accels/ no decels    SVE: 70/-3. Perineum with small < 1 cm non thrombosed hemorrhoid versus perineal skin engorgement.       ASSESSMENT/PLAN:  Pt is a 29 year old  female at 33w1d who was admitted on 2025 for  contractions.   Hospital Day 0    Active Problems:  Patient Active Problem List   Diagnosis    Encntr for suprvsn of normal first pregnancy, unsp trimester (HCC)    Carrier of Congenital Adrenal Hyperplasia, [id number] Alpha Thalassemia, HBA1/HBA2-related; partner is negative       contractions  - Completed BMTZ course.   - Contractions dissipated.   - SVE unchanged.   - Complete NST then discharge home.   - Recommend discharge home with labor precautions.   - Anusol daily Prescription sent.   - Recommend left breast ultrasound. Patient to schedule.   - Follow up JON visit 1-2 weeks.       Dr. Joseluis Rodriguez MD    [facility]     This note was created by Dragon voice recognition. Errors in content may be related to improper recognition by the system; efforts to review and correct have been done but errors may still exist. Please be advised the primary purpose of this note is for me to communicate medical care. Standard sentence structure is not always used. Medical terminology and medical abbreviations may be used. There may be grammatical, typographical, and automated fill ins that may have errors missed in proofreading.

## 2025-01-08 NOTE — PROGRESS NOTES
Discharge order received from MD. Pt in stable condition. Discharge instructions given regarding PTL signs, preeclampsia signs and symptoms, MD followup, and pain medications. Mom escorted self to vehicle.

## 2025-01-08 NOTE — DISCHARGE INSTRUCTIONS
Please call your OBGYN if you are:  having painful contractions that are 5-10 mins apart or closer  vaginal bleeding  leaking fluid  F/U with OB for routine prenatal  Schedule US for L breast  Use steroid cream topically as needed once a day for hemorrhoids

## 2025-01-08 NOTE — TELEPHONE ENCOUNTER
Dr. Patel,     Please sign off on form if you agree to:   Family Medical Leave Act; maternity leave  -Signature page will be the first page scanned  -From your Inbasket, Highlight the patient and click Chart   -Double click the 01/07/25 Forms Completion telephone encounter  -Scroll down to the Media section   -Click the blue Hyperlink: Dr.Chorzempa-Schainis Laine, 1/8/25  -Click Acknowledge located in the top right ribbon/menu   -Drag the mouse into the blank space of the document and a + sign will appear. Left click to   electronically sign the document.  -Once signed, simply exit out of the screen and you signature will be saved.     Thank you so much for your time,   Jon

## 2025-01-08 NOTE — PLAN OF CARE
Problem: Patient Centered Care  Goal: Patient preferences are identified and integrated in the patient's plan of care  Description: Interventions:  - What would you like us to know as we care for you? It's a girl!  - Provide timely, complete, and accurate information to patient/family  - Incorporate patient and family knowledge, values, beliefs, and cultural backgrounds into the planning and delivery of care  - Encourage patient/family to participate in care and decision-making at the level they choose  - Honor patient and family perspectives and choices  Outcome: Completed     Problem: Patient/Family Goals  Goal: Patient/Family Long Term Goal  Description: Patient's Long Term Goal: Uncomplicated Delivery     Interventions:  - Assessment/Monitoring  - Induction/Augmentation per protocol and Provider order  - C/S per protocol and Provider order   - Education  - Intervention per protocol and Provider order with education   - Involve patient in POC  - See additional Care Plan goals for specific interventions  Outcome: Completed  Goal: Patient/Family Short Term Goal  Description: Patient's Short Term Goal: Comfort and Pain Control     Interventions:   - Non Pharmacological pain intervention   - IV/IM and Epidural pain medication per Provider order and patient request  - Education  - Involve Patient in POC   - See additional Care Plan goals for specific interventions  Outcome: Completed     Problem: ANTEPARTUM/LABOR and DELIVERY  Goal: Maintain pregnancy as long as maternal and/or fetal condition is stable  Description: INTERVENTIONS:  - Maternal surveillance  - Fetal surveillance  - Monitor uterine activity  - Medications as ordered  - Bedrest  Outcome: Completed  Goal: Anxiety is at manageable level  Description: INTERVENTIONS:  - [place] patient to unit/surroundings  - Establish a trusting relationship with patient  - Discuss possible complications or alterations in birth plan  - Explain treatment plan  - Explain  testing/procedures prior to initiation  - Encourage participation in care  - Encourage verbalization of concerns/fears  - Identify coping mechanisms  - Administer/offer alternative therapies (Aroma therapy, distraction, guided imagery, massage, music therapy, therapeutic touch)  - Manage patient's environment (Avoid overstimulation of patient)  Outcome: Completed  Goal: Demonstrates ability to cope with hospitalization/illness  Description: INTERVENTIONS:  - Encourage verbalization of feelings/concerns/expectations  - Provide quiet environment  - Assist patient to identify own strengths and abilities  - Encourage patient to set small goals for self  - Encourage participation in diversional activity  - Reinforce positive adaptation of new coping behaviors  - Include patient/family/caregiver in decisions  Outcome: Completed

## 2025-01-09 NOTE — TELEPHONE ENCOUNTER
Family Medical Leave Act form completed and signed by the provider, valid Release of Information on file to send forms electronically. Form sent via [employer] message.

## 2025-01-21 ENCOUNTER — LAB ENCOUNTER (OUTPATIENT)
Dept: LAB | Age: 30
End: 2025-01-21
Attending: OBSTETRICS & GYNECOLOGY
Payer: COMMERCIAL

## 2025-01-21 ENCOUNTER — OFFICE VISIT (OUTPATIENT)
Dept: OBGYN CLINIC | Facility: CLINIC | Age: 30
End: 2025-01-21
Payer: COMMERCIAL

## 2025-01-21 VITALS
HEIGHT: 67 IN | BODY MASS INDEX: 32.65 KG/M2 | SYSTOLIC BLOOD PRESSURE: 130 MMHG | WEIGHT: 208 LBS | DIASTOLIC BLOOD PRESSURE: 84 MMHG

## 2025-01-21 DIAGNOSIS — Z34.03 ENCOUNTER FOR SUPERVISION OF NORMAL FIRST PREGNANCY IN THIRD TRIMESTER (HCC): Primary | ICD-10-CM

## 2025-01-21 DIAGNOSIS — L29.9 ITCHING: ICD-10-CM

## 2025-01-21 LAB
ALBUMIN SERPL-MCNC: 4.1 G/DL (ref 3.2–4.8)
ALBUMIN/GLOB SERPL: 1.6 {RATIO} (ref 1–2)
ALP LIVER SERPL-CCNC: 202 U/L
ALT SERPL-CCNC: 17 U/L
ANION GAP SERPL CALC-SCNC: 10 MMOL/L (ref 0–18)
AST SERPL-CCNC: 17 U/L (ref ?–34)
BILIRUB SERPL-MCNC: 0.3 MG/DL (ref 0.3–1.2)
BUN BLD-MCNC: 9 MG/DL (ref 9–23)
BUN/CREAT SERPL: 16.1 (ref 10–20)
CALCIUM BLD-MCNC: 9.4 MG/DL (ref 8.7–10.4)
CHLORIDE SERPL-SCNC: 107 MMOL/L (ref 98–112)
CO2 SERPL-SCNC: 22 MMOL/L (ref 21–32)
CREAT BLD-MCNC: 0.56 MG/DL
EGFRCR SERPLBLD CKD-EPI 2021: 126 ML/MIN/1.73M2 (ref 60–?)
FASTING STATUS PATIENT QL REPORTED: YES
GLOBULIN PLAS-MCNC: 2.5 G/DL (ref 2–3.5)
GLUCOSE BLD-MCNC: 87 MG/DL (ref 70–99)
OSMOLALITY SERPL CALC.SUM OF ELEC: 286 MOSM/KG (ref 275–295)
POTASSIUM SERPL-SCNC: 4.5 MMOL/L (ref 3.5–5.1)
PROT SERPL-MCNC: 6.6 G/DL (ref 5.7–8.2)
SODIUM SERPL-SCNC: 139 MMOL/L (ref 136–145)

## 2025-01-21 PROCEDURE — 82239 BILE ACIDS TOTAL: CPT | Performed by: OBSTETRICS & GYNECOLOGY

## 2025-01-21 PROCEDURE — 3075F SYST BP GE 130 - 139MM HG: CPT | Performed by: OBSTETRICS & GYNECOLOGY

## 2025-01-21 PROCEDURE — 3079F DIAST BP 80-89 MM HG: CPT | Performed by: OBSTETRICS & GYNECOLOGY

## 2025-01-21 PROCEDURE — 87081 CULTURE SCREEN ONLY: CPT | Performed by: OBSTETRICS & GYNECOLOGY

## 2025-01-21 PROCEDURE — 90678 RSV VACC PREF BIVALENT IM: CPT | Performed by: OBSTETRICS & GYNECOLOGY

## 2025-01-21 PROCEDURE — 90471 IMMUNIZATION ADMIN: CPT | Performed by: OBSTETRICS & GYNECOLOGY

## 2025-01-21 PROCEDURE — 3008F BODY MASS INDEX DOCD: CPT | Performed by: OBSTETRICS & GYNECOLOGY

## 2025-01-21 PROCEDURE — 87150 DNA/RNA AMPLIFIED PROBE: CPT | Performed by: OBSTETRICS & GYNECOLOGY

## 2025-01-21 PROCEDURE — 80053 COMPREHEN METABOLIC PANEL: CPT | Performed by: OBSTETRICS & GYNECOLOGY

## 2025-01-21 NOTE — PROGRESS NOTES
RETURN OB VISIT    Tommy Jim is a 29 year old  at 35w0d presenting for return OB visit. Today she reports no contractions, vaginal bleeding or leaking fluid. Baby is moving well.     She was hospitalized earlier this month for tPTL. She made change to 1cm dilation and received GBS ppx and  steroids. She was discharged when her contractions resolved.     Today she reports itching diffusely across her whole body including palms of her hands, worse in the mornings. This is not particularly disruptive and she has not taken anything for it. Will send bile acids and CMP for suspected cholestasis of pregnancy.     SVE per patient request 1/L/H    FH 35cm  GBS collected    Follow up in 1wk.     Era Sanchez DO

## 2025-01-22 LAB
BILE ACIDS: 2.3 UMOL/L
GROUP B STREP BY PCR FOR PCR OVT: NEGATIVE

## 2025-01-25 ENCOUNTER — HOSPITAL ENCOUNTER (OUTPATIENT)
Facility: HOSPITAL | Age: 30
Discharge: HOME | End: 2025-01-25
Attending: OBSTETRICS & GYNECOLOGY | Admitting: OBSTETRICS & GYNECOLOGY
Payer: COMMERCIAL

## 2025-01-25 VITALS
DIASTOLIC BLOOD PRESSURE: 81 MMHG | WEIGHT: 208 LBS | BODY MASS INDEX: 33 KG/M2 | HEART RATE: 124 BPM | RESPIRATION RATE: 18 BRPM | SYSTOLIC BLOOD PRESSURE: 124 MMHG | TEMPERATURE: 99 F

## 2025-01-25 PROCEDURE — 59025 FETAL NON-STRESS TEST: CPT

## 2025-01-25 PROCEDURE — 99212 OFFICE O/P EST SF 10 MIN: CPT

## 2025-01-26 NOTE — PROGRESS NOTES
Pt is a 29 year old female admitted to [facility].     Chief Complaint   Patient presents with    Decreased Fetal Movement     Patient states she felt the baby move an hour ago but not as much as normal. Denies ctx and vaginal bleeding. Also states she developed a fever yesterday after receiving RSV vaccine      Pt is  35w4d intra-uterine pregnancy.  History obtained, consents signed. Oriented to room, staff, and plan of care.

## 2025-01-26 NOTE — TRIAGE
[facility]  part of [facility]      Triage Note    Tommy Jim Patient Status:  Outpatient    10/14/1995 MRN [id number]   Location [facility] Attending Syl Patel*   Hosp Day # 0 PCP No primary care provider on file.      Para:   Estimated Date of Delivery: 25  Gestation: 35w4d    Chief Complaint    Decreased Fetal Movement         Allergies:  Allergies[1]    No orders of the defined types were placed in this encounter.      Lab Results   Component Value Date    WBC 5.1 2024    HGB 11.9 (L) 2024    HCT 35.7 2024    .0 2024    CREATSERUM 0.56 2025    BUN 9 2025     2025    K 4.5 2025     2025    CO2 22.0 2025    GLU 87 2025    CA 9.4 2025    ALB 4.1 2025    ALKPHO 202 (H) 2025    BILT 0.3 2025    TP 6.6 2025    AST 17 2025    ALT 17 2025       Clinitek UA  Lab Results   Component Value Date    GLUUR Normal 2025    SPECGRAVITY 1.020 2025    URINECUL  2025     <10,000 cfu/ml Multiple species present- probable contamination.       UA  Lab Results   Component Value Date    COLORUR Yellow 2025    CLARITY Turbid (A) 2025    SPECGRAVITY 1.020 2025    PROUR 20 (A) 2025    GLUUR Normal 2025    KETUR Negative 2025    BILUR Negative 2025    BLOODURINE 3+ (A) 2025    NITRITE Negative 2025    UROBILINOGEN Normal 2025    LEUUR 250 (A) 2025       Vitals:    25 2130   BP: 124/81   BP Location: Left arm   Pulse: (!) 124   Resp: 18   Temp: 99 °F (37.2 °C)   TempSrc: Oral   Weight: 94.3 kg (208 lb)       NST  Variability: Moderate           Accelerations: Yes           Decelerations: None            Baseline: 155 BPM           Uterine Irritability: No           Contractions: Irregular                    Contraction Frequency: x2                    Acoustic Stimulator: No           Nonstress Test Interpretation: Reactive           Nonstress Test Second Interpretation: Reactive          FHR Category: Category I             Additional Comments       Chief Complaint   Patient presents with    Decreased Fetal Movement     Patient states she felt the baby move an hour ago but not as much as normal. Denies ctx and vaginal bleeding. Also states she developed a fever yesterday after receiving RSV vaccine     Patient came in to triage for feeling the baby moving a hour ago, but less than normal. She also had complaints of a fever and nasal congestion after receiving the RSV vaccine om Tuesday. NST reactive and no present fever. MD notified. Advised patient that she can take tylenol and nasal spray for symptoms. Patient states she has felt more movement since being in triage and feels better seeing the baby looking well on the monitor. Verbal and written instructions given and patient verbalized understanding. Discharge order given. Discharged home in stable condition with belongings and partner.     Trevor SABILLON RN  1/25/2025 9:50 PM         [1] No Known Allergies

## 2025-01-27 ENCOUNTER — PATIENT MESSAGE (OUTPATIENT)
Dept: OBGYN CLINIC | Facility: CLINIC | Age: 30
End: 2025-01-27

## 2025-01-31 ENCOUNTER — HOSPITAL ENCOUNTER (OUTPATIENT)
Dept: ULTRASOUND IMAGING | Facility: HOSPITAL | Age: 30
Discharge: HOME | End: 2025-01-31
Attending: OBSTETRICS & GYNECOLOGY
Payer: COMMERCIAL

## 2025-01-31 ENCOUNTER — ROUTINE PRENATAL (OUTPATIENT)
Dept: OBGYN CLINIC | Facility: CLINIC | Age: 30
End: 2025-01-31
Payer: COMMERCIAL

## 2025-01-31 ENCOUNTER — HOSPITAL ENCOUNTER (OUTPATIENT)
Facility: HOSPITAL | Age: 30
Discharge: HOME | End: 2025-01-31
Attending: OBSTETRICS & GYNECOLOGY | Admitting: OBSTETRICS & GYNECOLOGY
Payer: COMMERCIAL

## 2025-01-31 VITALS
BODY MASS INDEX: 33.88 KG/M2 | SYSTOLIC BLOOD PRESSURE: 138 MMHG | HEIGHT: 67 IN | DIASTOLIC BLOOD PRESSURE: 94 MMHG | WEIGHT: 215.88 LBS

## 2025-01-31 VITALS — HEART RATE: 85 BPM | SYSTOLIC BLOOD PRESSURE: 121 MMHG | DIASTOLIC BLOOD PRESSURE: 70 MMHG

## 2025-01-31 DIAGNOSIS — O36.8190 DECREASED FETAL MOVEMENT AFFECTING MANAGEMENT OF PREGNANCY, ANTEPARTUM, SINGLE OR UNSPECIFIED FETUS (HCC): ICD-10-CM

## 2025-01-31 DIAGNOSIS — Z34.00 ENCNTR FOR SUPRVSN OF NORMAL FIRST PREGNANCY, UNSP TRIMESTER (HCC): Primary | Chronic | ICD-10-CM

## 2025-01-31 DIAGNOSIS — N63.21 LUMP IN UPPER OUTER QUADRANT OF LEFT BREAST: ICD-10-CM

## 2025-01-31 PROBLEM — O16.3 ELEVATED BLOOD PRESSURE AFFECTING PREGNANCY IN THIRD TRIMESTER, ANTEPARTUM (HCC): Status: ACTIVE | Noted: 2025-01-31

## 2025-01-31 LAB
ALBUMIN SERPL-MCNC: 3.8 G/DL (ref 3.2–4.8)
ALBUMIN/GLOB SERPL: 1.2 {RATIO} (ref 1–2)
ALP LIVER SERPL-CCNC: 245 U/L
ALT SERPL-CCNC: 36 U/L
ANION GAP SERPL CALC-SCNC: 8 MMOL/L (ref 0–18)
AST SERPL-CCNC: 30 U/L (ref ?–34)
BASOPHILS # BLD AUTO: 0.03 X10(3) UL (ref 0–0.2)
BASOPHILS NFR BLD AUTO: 0.4 %
BILIRUB SERPL-MCNC: 0.2 MG/DL (ref 0.3–1.2)
BUN BLD-MCNC: 11 MG/DL (ref 9–23)
BUN/CREAT SERPL: 15.5 (ref 10–20)
CALCIUM BLD-MCNC: 9.2 MG/DL (ref 8.7–10.4)
CHLORIDE SERPL-SCNC: 108 MMOL/L (ref 98–112)
CO2 SERPL-SCNC: 22 MMOL/L (ref 21–32)
CREAT BLD-MCNC: 0.71 MG/DL
CREAT UR-SCNC: 302.2 MG/DL
DEPRECATED RDW RBC AUTO: 38.8 FL (ref 35.1–46.3)
EGFRCR SERPLBLD CKD-EPI 2021: 117 ML/MIN/1.73M2 (ref 60–?)
EOSINOPHIL # BLD AUTO: 0.04 X10(3) UL (ref 0–0.7)
EOSINOPHIL NFR BLD AUTO: 0.5 %
ERYTHROCYTE [DISTWIDTH] IN BLOOD BY AUTOMATED COUNT: 13.6 % (ref 11–15)
FASTING STATUS PATIENT QL REPORTED: NO
GLOBULIN PLAS-MCNC: 3.2 G/DL (ref 2–3.5)
GLUCOSE BLD-MCNC: 77 MG/DL (ref 70–99)
HCT VFR BLD AUTO: 32.3 %
HGB BLD-MCNC: 10.7 G/DL
IMM GRANULOCYTES # BLD AUTO: 0.03 X10(3) UL (ref 0–1)
IMM GRANULOCYTES NFR BLD: 0.4 %
LYMPHOCYTES # BLD AUTO: 1.4 X10(3) UL (ref 1–4)
LYMPHOCYTES NFR BLD AUTO: 19 %
MCH RBC QN AUTO: 26.7 PG (ref 26–34)
MCHC RBC AUTO-ENTMCNC: 33.1 G/DL (ref 31–37)
MCV RBC AUTO: 80.5 FL
MONOCYTES # BLD AUTO: 0.42 X10(3) UL (ref 0.1–1)
MONOCYTES NFR BLD AUTO: 5.7 %
NEUTROPHILS # BLD AUTO: 5.44 X10 (3) UL (ref 1.5–7.7)
NEUTROPHILS # BLD AUTO: 5.44 X10(3) UL (ref 1.5–7.7)
NEUTROPHILS NFR BLD AUTO: 74 %
OSMOLALITY SERPL CALC.SUM OF ELEC: 284 MOSM/KG (ref 275–295)
PLATELET # BLD AUTO: 216 10(3)UL (ref 150–450)
POTASSIUM SERPL-SCNC: 4.5 MMOL/L (ref 3.5–5.1)
PROT SERPL-MCNC: 7 G/DL (ref 5.7–8.2)
PROT UR-MCNC: 82 MG/DL (ref ?–14)
PROT/CREAT UR-RTO: 0.27
RBC # BLD AUTO: 4.01 X10(6)UL
SODIUM SERPL-SCNC: 138 MMOL/L (ref 136–145)
WBC # BLD AUTO: 7.4 X10(3) UL (ref 4–11)

## 2025-01-31 PROCEDURE — 59025 FETAL NON-STRESS TEST: CPT | Performed by: OBSTETRICS & GYNECOLOGY

## 2025-01-31 PROCEDURE — 82570 ASSAY OF URINE CREATININE: CPT | Performed by: OBSTETRICS & GYNECOLOGY

## 2025-01-31 PROCEDURE — 3075F SYST BP GE 130 - 139MM HG: CPT | Performed by: OBSTETRICS & GYNECOLOGY

## 2025-01-31 PROCEDURE — 99213 OFFICE O/P EST LOW 20 MIN: CPT

## 2025-01-31 PROCEDURE — 59025 FETAL NON-STRESS TEST: CPT

## 2025-01-31 PROCEDURE — 3080F DIAST BP >= 90 MM HG: CPT | Performed by: OBSTETRICS & GYNECOLOGY

## 2025-01-31 PROCEDURE — 85025 COMPLETE CBC W/AUTO DIFF WBC: CPT | Performed by: OBSTETRICS & GYNECOLOGY

## 2025-01-31 PROCEDURE — 3008F BODY MASS INDEX DOCD: CPT | Performed by: OBSTETRICS & GYNECOLOGY

## 2025-01-31 PROCEDURE — 84156 ASSAY OF PROTEIN URINE: CPT | Performed by: OBSTETRICS & GYNECOLOGY

## 2025-01-31 PROCEDURE — 80053 COMPREHEN METABOLIC PANEL: CPT | Performed by: OBSTETRICS & GYNECOLOGY

## 2025-01-31 PROCEDURE — 76642 ULTRASOUND BREAST LIMITED: CPT | Performed by: OBSTETRICS & GYNECOLOGY

## 2025-01-31 PROCEDURE — 36415 COLL VENOUS BLD VENIPUNCTURE: CPT

## 2025-01-31 NOTE — PROGRESS NOTES
28 y/o  at 36 3/7 W  + FM, no vaginal bleeding, no LOF, no further contractions.   DW her labor precautions  She is c/o decreased fetal movement. Check NST today which is reactive.   /82. No HA, visual changes or RUQ pain. Will recheck today.   Repeat bp still elevated. To L&D to r/o preeclampsia.

## 2025-01-31 NOTE — TRIAGE
[facility]  part of [facility]      Triage Note    Tommy Jim Patient Status:  Outpatient    10/14/1995 MRN [id number]   Location [facility] Attending Joseluis Rodriguez MD   Hosp Day # 0 PCP No primary care provider on file.      Para:   Estimated Date of Delivery: 25  Gestation: 36w3d    Chief Complaint    R/o Preeclampsia         Allergies:  Allergies[1]    Orders Placed This Encounter   Procedures    Comp Metabolic Panel (14)    CBC With Differential With Platelet    Protein/Creatinine Ratio, Urine Random       Lab Results   Component Value Date    WBC 7.4 2025    HGB 10.7 (L) 2025    HCT 32.3 (L) 2025    .0 2025    CREATSERUM 0.71 2025    BUN 11 2025     2025    K 4.5 2025     2025    CO2 22.0 2025    GLU 77 2025    CA 9.2 2025    ALB 3.8 2025    ALKPHO 245 (H) 2025    BILT 0.2 (L) 2025    TP 7.0 2025    AST 30 2025    ALT 36 2025       Clinitek UA  Lab Results   Component Value Date    GLUUR Normal 2025    SPECGRAVITY 1.020 2025    URINECUL  2025     <10,000 cfu/ml Multiple species present- probable contamination.       UA  Lab Results   Component Value Date    COLORUR Yellow 2025    CLARITY Turbid (A) 2025    SPECGRAVITY 1.020 2025    PROUR 20 (A) 2025    GLUUR Normal 2025    KETUR Negative 2025    BILUR Negative 2025    BLOODURINE 3+ (A) 2025    NITRITE Negative 2025    UROBILINOGEN Normal 2025    LEUUR 250 (A) 2025       Vitals:    25 1300 25 1315 25 1330 25 1345   BP: 129/81 123/75 126/85 121/70   Pulse: 85 81 82 85       NST  Variability: Moderate           Accelerations: Yes           Decelerations: None            Baseline: 135 BPM           Uterine Irritability: No           Contractions: Irregular                     Contraction Frequency: mild                   Acoustic Stimulator: No           Nonstress Test Interpretation: Reactive           Nonstress Test Second Interpretation: Reactive                        Additional Comments Comments: B/P wnl. [facility] labs wnl. Dr Rodriguez reviewed the case and pt discharged ambulatory with verbal instructions including kick count and warning signs. All questions answered     Chief Complaint   Patient presents with    R/o Preeclampsia      at 36 3/7 IUP sent from the office for r/o pre-e due to elevated b/p. Pt denies HA, visual changes or RUQ pain.          Stormy CANNON RN  2025 2:08 PM         [1] No Known Allergies

## 2025-02-04 ENCOUNTER — ROUTINE PRENATAL (OUTPATIENT)
Dept: OBGYN CLINIC | Facility: CLINIC | Age: 30
End: 2025-02-04
Payer: COMMERCIAL

## 2025-02-04 VITALS
WEIGHT: 217.38 LBS | HEIGHT: 67 IN | BODY MASS INDEX: 34.12 KG/M2 | DIASTOLIC BLOOD PRESSURE: 78 MMHG | SYSTOLIC BLOOD PRESSURE: 128 MMHG

## 2025-02-04 DIAGNOSIS — Z34.00 ENCNTR FOR SUPRVSN OF NORMAL FIRST PREGNANCY, UNSP TRIMESTER (HCC): Primary | Chronic | ICD-10-CM

## 2025-02-04 PROBLEM — O47.00 PRETERM CONTRACTIONS (HCC): Status: RESOLVED | Noted: 2025-01-08 | Resolved: 2025-02-04

## 2025-02-04 PROCEDURE — 3008F BODY MASS INDEX DOCD: CPT | Performed by: OBSTETRICS & GYNECOLOGY

## 2025-02-04 PROCEDURE — 3074F SYST BP LT 130 MM HG: CPT | Performed by: OBSTETRICS & GYNECOLOGY

## 2025-02-04 PROCEDURE — 3078F DIAST BP <80 MM HG: CPT | Performed by: OBSTETRICS & GYNECOLOGY

## 2025-02-04 NOTE — PROGRESS NOTES
Denies pain, bleeding, LOF.   No fetal movement yet   Denies headache, vision changes.    Had elevated blood presure last visit and seen in OB triage for r/o preeclampsia   Urine P:C 0.27    29 year old  at 37w0d      Return OB  Pre-Clemente Care: UTD.      Patient Active Problem List   Diagnosis    Encntr for suprvsn of normal first pregnancy, unsp trimester (HCC)    Carrier of Congenital Adrenal Hyperplasia, [id number] Alpha Thalassemia, HBA1/HBA2-related; partner is negative    Elevated blood pressure affecting pregnancy in third trimester, antepartum (HCC)       - Return to clinic in: 1

## 2025-02-05 ENCOUNTER — PATIENT MESSAGE (OUTPATIENT)
Dept: OBGYN CLINIC | Facility: CLINIC | Age: 30
End: 2025-02-05

## 2025-02-05 NOTE — TELEPHONE ENCOUNTER
Called pt informed due to 37 weeks and elevated BP request she calls office instead of my chart.  Pt agrees.  Per pt BP checking 15 mins to hourly 135-140/93.  Pt has slight headache has not taken tylenol, denies RUQ pain/visional disturbance.  Pt not on BP meds currently.  Pt to follow-up JON 02/11/2025.      Advised pt to check BP bid and if have symptoms to check BP, parameters provided and encouraged to send pics of BP unless over parameter to call.  Informed to bring BP meds for calibration of machine and further BP education.  Also informed of provider on-call if needed when office is closed.  Asked pt to send pictures of BP on Friday for review.  Pt agrees..

## 2025-02-06 ENCOUNTER — HOSPITAL ENCOUNTER (INPATIENT)
Facility: HOSPITAL | Age: 30
LOS: 5 days | Discharge: HOME | End: 2025-02-11
Attending: ADVANCED PRACTICE MIDWIFE | Admitting: OBSTETRICS & GYNECOLOGY
Payer: COMMERCIAL

## 2025-02-06 PROBLEM — Z34.90 PREGNANCY (HCC): Status: ACTIVE | Noted: 2025-02-06

## 2025-02-06 LAB
ALBUMIN SERPL-MCNC: 3.9 G/DL (ref 3.2–4.8)
ALBUMIN/GLOB SERPL: 1.3 {RATIO} (ref 1–2)
ALP LIVER SERPL-CCNC: 266 U/L
ALT SERPL-CCNC: 27 U/L
ANION GAP SERPL CALC-SCNC: 11 MMOL/L (ref 0–18)
ANTIBODY SCREEN: NEGATIVE
AST SERPL-CCNC: 29 U/L (ref ?–34)
BASOPHILS # BLD AUTO: 0.02 X10(3) UL (ref 0–0.2)
BASOPHILS NFR BLD AUTO: 0.2 %
BILIRUB SERPL-MCNC: 0.5 MG/DL (ref 0.3–1.2)
BUN BLD-MCNC: 11 MG/DL (ref 9–23)
BUN/CREAT SERPL: 17.5 (ref 10–20)
CALCIUM BLD-MCNC: 8.8 MG/DL (ref 8.7–10.4)
CHLORIDE SERPL-SCNC: 107 MMOL/L (ref 98–112)
CO2 SERPL-SCNC: 19 MMOL/L (ref 21–32)
CREAT BLD-MCNC: 0.63 MG/DL
CREAT UR-SCNC: 237.6 MG/DL
DEPRECATED RDW RBC AUTO: 41 FL (ref 35.1–46.3)
EGFRCR SERPLBLD CKD-EPI 2021: 122 ML/MIN/1.73M2 (ref 60–?)
EOSINOPHIL # BLD AUTO: 0.01 X10(3) UL (ref 0–0.7)
EOSINOPHIL NFR BLD AUTO: 0.1 %
ERYTHROCYTE [DISTWIDTH] IN BLOOD BY AUTOMATED COUNT: 14.6 % (ref 11–15)
FASTING STATUS PATIENT QL REPORTED: NO
GLOBULIN PLAS-MCNC: 3.1 G/DL (ref 2–3.5)
GLUCOSE BLD-MCNC: 90 MG/DL (ref 70–99)
HCT VFR BLD AUTO: 36.4 %
HGB BLD-MCNC: 11.5 G/DL
HIV 1+2 AB+HIV1 P24 AG SERPL QL IA: NONREACTIVE
IMM GRANULOCYTES # BLD AUTO: 0.02 X10(3) UL (ref 0–1)
IMM GRANULOCYTES NFR BLD: 0.2 %
LYMPHOCYTES # BLD AUTO: 0.36 X10(3) UL (ref 1–4)
LYMPHOCYTES NFR BLD AUTO: 4.4 %
MCH RBC QN AUTO: 25.8 PG (ref 26–34)
MCHC RBC AUTO-ENTMCNC: 31.6 G/DL (ref 31–37)
MCV RBC AUTO: 81.6 FL
MONOCYTES # BLD AUTO: 0.21 X10(3) UL (ref 0.1–1)
MONOCYTES NFR BLD AUTO: 2.6 %
NEUTROPHILS # BLD AUTO: 7.59 X10 (3) UL (ref 1.5–7.7)
NEUTROPHILS # BLD AUTO: 7.59 X10(3) UL (ref 1.5–7.7)
NEUTROPHILS NFR BLD AUTO: 92.5 %
OSMOLALITY SERPL CALC.SUM OF ELEC: 283 MOSM/KG (ref 275–295)
PLATELET # BLD AUTO: 236 10(3)UL (ref 150–450)
POTASSIUM SERPL-SCNC: 4.3 MMOL/L (ref 3.5–5.1)
PROT SERPL-MCNC: 7 G/DL (ref 5.7–8.2)
PROT UR-MCNC: 84.8 MG/DL (ref ?–14)
PROT/CREAT UR-RTO: 0.36
RBC # BLD AUTO: 4.46 X10(6)UL
RH BLOOD TYPE: POSITIVE
SODIUM SERPL-SCNC: 137 MMOL/L (ref 136–145)
T PALLIDUM AB SER QL IA: NONREACTIVE
WBC # BLD AUTO: 8.2 X10(3) UL (ref 4–11)

## 2025-02-06 PROCEDURE — 3E0P7VZ INTRODUCTION OF HORMONE INTO FEMALE REPRODUCTIVE, VIA NATURAL OR ARTIFICIAL OPENING: ICD-10-PCS | Performed by: OBSTETRICS & GYNECOLOGY

## 2025-02-06 RX ORDER — ONDANSETRON 2 MG/ML
4 INJECTION INTRAMUSCULAR; INTRAVENOUS EVERY 6 HOURS PRN
Status: DISCONTINUED | OUTPATIENT
Start: 2025-02-06 | End: 2025-02-07 | Stop reason: HOSPADM

## 2025-02-06 RX ORDER — DEXTROSE, SODIUM CHLORIDE, SODIUM LACTATE, POTASSIUM CHLORIDE, AND CALCIUM CHLORIDE 5; .6; .31; .03; .02 G/100ML; G/100ML; G/100ML; G/100ML; G/100ML
INJECTION, SOLUTION INTRAVENOUS CONTINUOUS
Status: DISCONTINUED | OUTPATIENT
Start: 2025-02-06 | End: 2025-02-07 | Stop reason: HOSPADM

## 2025-02-06 RX ORDER — IBUPROFEN 600 MG/1
600 TABLET, FILM COATED ORAL ONCE AS NEEDED
Status: DISCONTINUED | OUTPATIENT
Start: 2025-02-06 | End: 2025-02-07 | Stop reason: HOSPADM

## 2025-02-06 RX ORDER — BENZONATATE 100 MG/1
100 CAPSULE ORAL 3 TIMES DAILY PRN
Status: DISCONTINUED | OUTPATIENT
Start: 2025-02-06 | End: 2025-02-11

## 2025-02-06 RX ORDER — ACETAMINOPHEN 500 MG
1000 TABLET ORAL EVERY 6 HOURS PRN
Status: DISCONTINUED | OUTPATIENT
Start: 2025-02-06 | End: 2025-02-07 | Stop reason: HOSPADM

## 2025-02-06 RX ORDER — LIDOCAINE HYDROCHLORIDE 10 MG/ML
30 INJECTION, SOLUTION EPIDURAL; INFILTRATION; INTRACAUDAL; PERINEURAL ONCE
Status: DISCONTINUED | OUTPATIENT
Start: 2025-02-06 | End: 2025-02-07 | Stop reason: HOSPADM

## 2025-02-06 RX ORDER — SODIUM CHLORIDE, SODIUM LACTATE, POTASSIUM CHLORIDE, CALCIUM CHLORIDE 600; 310; 30; 20 MG/100ML; MG/100ML; MG/100ML; MG/100ML
INJECTION, SOLUTION INTRAVENOUS AS NEEDED
Status: DISCONTINUED | OUTPATIENT
Start: 2025-02-06 | End: 2025-02-07 | Stop reason: HOSPADM

## 2025-02-06 RX ORDER — CITRIC ACID/SODIUM CITRATE 334-500MG
30 SOLUTION, ORAL ORAL AS NEEDED
Status: COMPLETED | OUTPATIENT
Start: 2025-02-06 | End: 2025-02-07

## 2025-02-06 RX ORDER — ACETAMINOPHEN 500 MG
500 TABLET ORAL EVERY 6 HOURS PRN
Status: DISCONTINUED | OUTPATIENT
Start: 2025-02-06 | End: 2025-02-07 | Stop reason: HOSPADM

## 2025-02-06 RX ORDER — TERBUTALINE SULFATE 1 MG/ML
0.25 INJECTION SUBCUTANEOUS AS NEEDED
Status: DISCONTINUED | OUTPATIENT
Start: 2025-02-06 | End: 2025-02-07 | Stop reason: HOSPADM

## 2025-02-06 NOTE — PROGRESS NOTES
Pt is a 29 year old female admitted to [facility].  [facility].      Pt is  37w2d intra-uterine pregnancy.  History obtained, consents signed. Oriented to room, staff, and plan of care.

## 2025-02-06 NOTE — TELEPHONE ENCOUNTER
Called pt informed again should have called for provider on-call to address concern.  Pt states, \"it started last night:\" informed again provider could have address concern last night.  Due to elevated BP, vomiting, contx sent to [facility].  Pt agrees.    Called [facility], informed Sima TY.    Paged Dr. Rodriguez and informed, agrees.

## 2025-02-06 NOTE — H&P
[facility]  Obstetrics and Gynecology  History & Physical    Tommy Jim Patient Status:  Inpatient    10/14/1995 MRN [id number]   Location [facility] CENTER Attending Joseluis Rodriguez MD   Hospital Day 0 PCP No primary care provider on file.     CC: Patient is here for Induction of labor (IOL) 2/2 mild preeclampsia. Initially presented to OB triage for rule out labor and elevated blood pressure.     SUBJECTIVE:    Tommy Jim is a 29 year old  female at 37w2d Estimated Date of Delivery: 25 who is being admitted for IOL 2/2 mild preeclampsia. Her current obstetrical history is significant for mild preeclampsia and GBS negative.  Patient reports contractions, denies any vaginal bleeding or LOF. Noted good fetal movement. Denies headache. Noted having irregular headache in the last few days. Denies any vision changes, chest pain and shortness of breath.     Noted recent non productive cough.     VAUGHN Confirmation  LMP: Patient's last menstrual period was 05/15/2024.  VAUGHN: 2025, by Ultrasound     OB Ultrasounds:   1) MFM US 25:   \"Single IUP in cephalic presentation.    Placenta is anterior.   A 3 vessel cord is noted.  Cardiac activity is present at 138 bpm  EFW 2736 g ( 6 lb 1 oz); 85%.    MVP is 3.9 cm . VINCENT 11.4 cm           BIOPHYSICAL PROFILE:  Movement:    2/2  Tone:            2/2  Breathin/2  Fluid:             2/2  TOTAL:         8/8\"       Obstetric History:   OB History    Para Term  AB Living   1 0 0 0 0 0   SAB IAB Ectopic Multiple Live Births   0 0 0 0 0      # Outcome Date GA Lbr Gume/2nd Weight Sex Type Anes PTL Lv   1 Current              Past Medical History:   Past Medical History:    Anxiety    Depression    Patient denies medical problems     Past Social History:   Past Surgical History:   Procedure Laterality Date    Patient denies any surgical history       Family History:   Family History   Problem Relation Age  of Onset    Diabetes Father     Hypertension Father     Hypertension Mother     Hyperlipidemia Mother     Diabetes Mother     No Known Problems Maternal Grandmother     No Known Problems Maternal Grandfather     No Known Problems Paternal Grandmother     No Known Problems Paternal Grandfather     No Known Problems Brother     Depression Sister      Social History:   Social History     Tobacco Use    Smoking status: Never    Smokeless tobacco: Never   Substance Use Topics    Alcohol use: Not Currently       Home Meds: Prescriptions Prior to Admission[1]  Allergies: Allergies[2]    OBJECTIVE:    Temp:  [98 °F (36.7 °C)-99.4 °F (37.4 °C)] 99.4 °F (37.4 °C)  Pulse:  [] 102  Resp:  [16-18] 18  BP: (133-142)/() 134/85  There is no height or weight on file to calculate BMI.    General: AAO. NAD  Lungs: respirations non labored.   CV: Peripherial perfusion normal bilaterally   Abdomen: soft, nontender, nondistended, no abnormal masses, no epigastric pain and FHT present, gravid   Extremities: negative edema bilaterally, negative calf tenderness bilaterally.     FHT: moderate variability/155 BPM / Positive accelerations/Negative decelerations   TOCO: q 3-5 minutes    SVE: 1-2 / 80 / -3 per RN in triage.   Prenatal Labs Brief Review   Blood Type:   Lab Results   Component Value Date    ABO O 2025    RH Positive 2025     GBS:  Negative      Inpatient labs:  Lab Results   Component Value Date    WBC 8.2 2025    HGB 11.5 2025    HCT 36.4 2025    .0 2025    CREATSERUM 0.63 2025    BUN 11 2025     2025    K 4.3 2025     2025    CO2 19.0 2025    GLU 90 2025    CA 8.8 2025    ALB 3.9 2025    ALKPHO 266 2025    BILT 0.5 2025    TP 7.0 2025    AST 29 2025    ALT 27 2025       ASSESSMENT/ PLAN:    Tommy Jim is a 29 year old  female at 37w2d Estimated Date of Delivery: 25  who is being admitted for IOL 2/2 mild preeclampisa.    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 IOL:   -Cervical ripening: Cytotec 25 mcg per vagina every 4-6 hrs PRN  2. Fetal monitoring: CEFM  3. GBS: Negative  4. Pain: IV pain medications, Nitrous oxide, and Epidural available upon patient request.   5. Mid preeclampsia  - diagnosis with elevated blood presure greater than 4 hours apart and proteinuria.   - Labs significant for proteinuria.   - No signs /symptoms of severe preeclampsia.   - Reviewed signs or symptoms and needing magnesium if developing severe features.      Risks, benefits, alternatives and possible complications have been discussed in detail with the patient.  Pre-admission, admission, and post admission procedures and expectations were discussed in detail.  All questions answered, all appropriate consents will be signed at the Hospital. Admission is planned for today.  anticipated.    Dr. Joseluis Rodriguez MD    [facility]              [1]   Medications Prior to Admission   Medication Sig Dispense Refill Last Dose/Taking    prenatal vitamin with DHA 27-0.8-228 MG Oral Cap Take 1 capsule by mouth daily.   Taking    hydrocortisone 2.5 % External Cream Place 1 Application rectally daily. 28 g 1     famotidine (PEPCID AC MAXIMUM STRENGTH) 20 MG Oral Tab Take 1 tablet (20 mg total) by mouth 2 (two) times daily as needed for Heartburn. 20 tablet 1     hydrocortisone 2.5 % External Cream Place rectally.       hydrocortisone 25 MG Rectal Suppos Place 1 suppository (25 mg total) rectally 3 (three) times daily as needed.       Wheat Dextrin (BENEFIBER) Oral Powder Take 3 g by mouth daily.      [2] No Known  Allergies

## 2025-02-07 ENCOUNTER — ANESTHESIA EVENT (OUTPATIENT)
Dept: OBGYN UNIT | Facility: HOSPITAL | Age: 30
End: 2025-02-07
Payer: COMMERCIAL

## 2025-02-07 ENCOUNTER — ANESTHESIA (OUTPATIENT)
Dept: OBGYN UNIT | Facility: HOSPITAL | Age: 30
End: 2025-02-07
Payer: COMMERCIAL

## 2025-02-07 LAB
FLUAV + FLUBV RNA SPEC NAA+PROBE: NOT DETECTED
FLUAV + FLUBV RNA SPEC NAA+PROBE: NOT DETECTED
RSV RNA SPEC NAA+PROBE: NOT DETECTED
SARS-COV-2 RNA RESP QL NAA+PROBE: NOT DETECTED

## 2025-02-07 PROCEDURE — 59514 CESAREAN DELIVERY ONLY: CPT | Performed by: OBSTETRICS & GYNECOLOGY

## 2025-02-07 PROCEDURE — 3E033VJ INTRODUCTION OF OTHER HORMONE INTO PERIPHERAL VEIN, PERCUTANEOUS APPROACH: ICD-10-PCS | Performed by: OBSTETRICS & GYNECOLOGY

## 2025-02-07 PROCEDURE — 59510 CESAREAN DELIVERY: CPT | Performed by: OBSTETRICS & GYNECOLOGY

## 2025-02-07 RX ORDER — DIPHENHYDRAMINE HYDROCHLORIDE 50 MG/ML
12.5 INJECTION INTRAMUSCULAR; INTRAVENOUS EVERY 4 HOURS PRN
Status: ACTIVE | OUTPATIENT
Start: 2025-02-07 | End: 2025-02-08

## 2025-02-07 RX ORDER — METOCLOPRAMIDE HYDROCHLORIDE 5 MG/ML
INJECTION INTRAMUSCULAR; INTRAVENOUS
Status: DISPENSED
Start: 2025-02-07 | End: 2025-02-08

## 2025-02-07 RX ORDER — KETOROLAC TROMETHAMINE 30 MG/ML
30 INJECTION, SOLUTION INTRAMUSCULAR; INTRAVENOUS EVERY 6 HOURS
Status: COMPLETED | OUTPATIENT
Start: 2025-02-07 | End: 2025-02-08

## 2025-02-07 RX ORDER — LIDOCAINE HYDROCHLORIDE AND EPINEPHRINE 15; 5 MG/ML; UG/ML
INJECTION, SOLUTION EPIDURAL
Status: COMPLETED | OUTPATIENT
Start: 2025-02-07 | End: 2025-02-07

## 2025-02-07 RX ORDER — FAMOTIDINE 20 MG/1
20 TABLET, FILM COATED ORAL ONCE
Status: DISCONTINUED | OUTPATIENT
Start: 2025-02-07 | End: 2025-02-07 | Stop reason: HOSPADM

## 2025-02-07 RX ORDER — PROCHLORPERAZINE EDISYLATE 5 MG/ML
5 INJECTION INTRAMUSCULAR; INTRAVENOUS ONCE AS NEEDED
Status: ACTIVE | OUTPATIENT
Start: 2025-02-07 | End: 2025-02-07

## 2025-02-07 RX ORDER — NALBUPHINE HYDROCHLORIDE 10 MG/ML
2.5 INJECTION INTRAMUSCULAR; INTRAVENOUS; SUBCUTANEOUS
Status: DISCONTINUED | OUTPATIENT
Start: 2025-02-07 | End: 2025-02-11

## 2025-02-07 RX ORDER — SODIUM CHLORIDE, SODIUM LACTATE, POTASSIUM CHLORIDE, CALCIUM CHLORIDE 600; 310; 30; 20 MG/100ML; MG/100ML; MG/100ML; MG/100ML
INJECTION, SOLUTION INTRAVENOUS CONTINUOUS PRN
Status: DISCONTINUED | OUTPATIENT
Start: 2025-02-07 | End: 2025-02-07 | Stop reason: SURG

## 2025-02-07 RX ORDER — METOCLOPRAMIDE HYDROCHLORIDE 5 MG/ML
10 INJECTION INTRAMUSCULAR; INTRAVENOUS ONCE
Status: COMPLETED | OUTPATIENT
Start: 2025-02-07 | End: 2025-02-07

## 2025-02-07 RX ORDER — BUPIVACAINE HCL/0.9 % NACL/PF 0.25 %
5 PLASTIC BAG, INJECTION (ML) EPIDURAL AS NEEDED
Status: DISCONTINUED | OUTPATIENT
Start: 2025-02-07 | End: 2025-02-11

## 2025-02-07 RX ORDER — HALOPERIDOL 5 MG/ML
0.5 INJECTION INTRAMUSCULAR ONCE AS NEEDED
Status: ACTIVE | OUTPATIENT
Start: 2025-02-07 | End: 2025-02-07

## 2025-02-07 RX ORDER — HYDROMORPHONE HYDROCHLORIDE 1 MG/ML
0.4 INJECTION, SOLUTION INTRAMUSCULAR; INTRAVENOUS; SUBCUTANEOUS
Status: ACTIVE | OUTPATIENT
Start: 2025-02-07 | End: 2025-02-08

## 2025-02-07 RX ORDER — ACETAMINOPHEN 325 MG/1
650 TABLET ORAL EVERY 6 HOURS PRN
Status: ACTIVE | OUTPATIENT
Start: 2025-02-07 | End: 2025-02-08

## 2025-02-07 RX ORDER — DIPHENHYDRAMINE HCL 25 MG
25 CAPSULE ORAL EVERY 4 HOURS PRN
Status: ACTIVE | OUTPATIENT
Start: 2025-02-07 | End: 2025-02-08

## 2025-02-07 RX ORDER — DOCUSATE SODIUM 100 MG/1
100 CAPSULE, LIQUID FILLED ORAL
Status: DISCONTINUED | OUTPATIENT
Start: 2025-02-07 | End: 2025-02-11

## 2025-02-07 RX ORDER — METOCLOPRAMIDE 10 MG/1
10 TABLET ORAL ONCE
Status: COMPLETED | OUTPATIENT
Start: 2025-02-07 | End: 2025-02-07

## 2025-02-07 RX ORDER — ONDANSETRON 2 MG/ML
4 INJECTION INTRAMUSCULAR; INTRAVENOUS EVERY 6 HOURS PRN
Status: DISCONTINUED | OUTPATIENT
Start: 2025-02-07 | End: 2025-02-11

## 2025-02-07 RX ORDER — KETOROLAC TROMETHAMINE 30 MG/ML
30 INJECTION, SOLUTION INTRAMUSCULAR; INTRAVENOUS ONCE
Status: ACTIVE | OUTPATIENT
Start: 2025-02-07 | End: 2025-02-09

## 2025-02-07 RX ORDER — ONDANSETRON 2 MG/ML
4 INJECTION INTRAMUSCULAR; INTRAVENOUS ONCE AS NEEDED
Status: ACTIVE | OUTPATIENT
Start: 2025-02-07 | End: 2025-02-07

## 2025-02-07 RX ORDER — LIDOCAINE HYDROCHLORIDE 10 MG/ML
INJECTION, SOLUTION INFILTRATION; PERINEURAL
Status: COMPLETED | OUTPATIENT
Start: 2025-02-07 | End: 2025-02-07

## 2025-02-07 RX ORDER — ACETAMINOPHEN 500 MG
1000 TABLET ORAL ONCE
Status: DISCONTINUED | OUTPATIENT
Start: 2025-02-07 | End: 2025-02-07 | Stop reason: HOSPADM

## 2025-02-07 RX ORDER — HYDROMORPHONE HYDROCHLORIDE 1 MG/ML
0.6 INJECTION, SOLUTION INTRAMUSCULAR; INTRAVENOUS; SUBCUTANEOUS
Status: ACTIVE | OUTPATIENT
Start: 2025-02-07 | End: 2025-02-08

## 2025-02-07 RX ORDER — GABAPENTIN 300 MG/1
300 CAPSULE ORAL EVERY 8 HOURS PRN
Status: DISCONTINUED | OUTPATIENT
Start: 2025-02-07 | End: 2025-02-11

## 2025-02-07 RX ORDER — SODIUM CHLORIDE, SODIUM LACTATE, POTASSIUM CHLORIDE, CALCIUM CHLORIDE 600; 310; 30; 20 MG/100ML; MG/100ML; MG/100ML; MG/100ML
INJECTION, SOLUTION INTRAVENOUS CONTINUOUS
Status: DISCONTINUED | OUTPATIENT
Start: 2025-02-07 | End: 2025-02-11

## 2025-02-07 RX ORDER — MORPHINE SULFATE 1 MG/ML
INJECTION, SOLUTION EPIDURAL; INTRATHECAL; INTRAVENOUS AS NEEDED
Status: DISCONTINUED | OUTPATIENT
Start: 2025-02-07 | End: 2025-02-07 | Stop reason: SURG

## 2025-02-07 RX ORDER — NALBUPHINE HYDROCHLORIDE 10 MG/ML
2.5 INJECTION INTRAMUSCULAR; INTRAVENOUS; SUBCUTANEOUS EVERY 4 HOURS PRN
Status: ACTIVE | OUTPATIENT
Start: 2025-02-07 | End: 2025-02-08

## 2025-02-07 RX ORDER — AMMONIA 15 % (W/V)
0.3 AMPUL (EA) INHALATION AS NEEDED
Status: DISCONTINUED | OUTPATIENT
Start: 2025-02-07 | End: 2025-02-11

## 2025-02-07 RX ORDER — ACETAMINOPHEN 500 MG
1000 TABLET ORAL EVERY 6 HOURS
Status: DISCONTINUED | OUTPATIENT
Start: 2025-02-07 | End: 2025-02-11

## 2025-02-07 RX ORDER — FAMOTIDINE 10 MG/ML
INJECTION, SOLUTION INTRAVENOUS
Status: DISPENSED
Start: 2025-02-07 | End: 2025-02-08

## 2025-02-07 RX ORDER — ONDANSETRON 2 MG/ML
INJECTION INTRAMUSCULAR; INTRAVENOUS AS NEEDED
Status: DISCONTINUED | OUTPATIENT
Start: 2025-02-07 | End: 2025-02-07 | Stop reason: SURG

## 2025-02-07 RX ORDER — IBUPROFEN 600 MG/1
600 TABLET, FILM COATED ORAL EVERY 6 HOURS
Status: DISCONTINUED | OUTPATIENT
Start: 2025-02-08 | End: 2025-02-11

## 2025-02-07 RX ORDER — HYDROCODONE BITARTRATE AND ACETAMINOPHEN 7.5; 325 MG/1; MG/1
1 TABLET ORAL EVERY 6 HOURS PRN
Status: ACTIVE | OUTPATIENT
Start: 2025-02-07 | End: 2025-02-08

## 2025-02-07 RX ORDER — FAMOTIDINE 10 MG/ML
20 INJECTION, SOLUTION INTRAVENOUS ONCE
Status: DISCONTINUED | OUTPATIENT
Start: 2025-02-07 | End: 2025-02-07 | Stop reason: HOSPADM

## 2025-02-07 RX ORDER — HYDROCODONE BITARTRATE AND ACETAMINOPHEN 7.5; 325 MG/1; MG/1
2 TABLET ORAL EVERY 6 HOURS PRN
Status: ACTIVE | OUTPATIENT
Start: 2025-02-07 | End: 2025-02-08

## 2025-02-07 RX ORDER — SIMETHICONE 80 MG
80 TABLET,CHEWABLE ORAL 3 TIMES DAILY PRN
Status: DISCONTINUED | OUTPATIENT
Start: 2025-02-07 | End: 2025-02-11

## 2025-02-07 RX ORDER — NALOXONE HYDROCHLORIDE 0.4 MG/ML
0.08 INJECTION, SOLUTION INTRAMUSCULAR; INTRAVENOUS; SUBCUTANEOUS
Status: ACTIVE | OUTPATIENT
Start: 2025-02-07 | End: 2025-02-08

## 2025-02-07 RX ORDER — BUPIVACAINE HYDROCHLORIDE 2.5 MG/ML
20 INJECTION, SOLUTION EPIDURAL; INFILTRATION; INTRACAUDAL ONCE
Status: DISCONTINUED | OUTPATIENT
Start: 2025-02-07 | End: 2025-02-07 | Stop reason: HOSPADM

## 2025-02-07 RX ORDER — BISACODYL 10 MG
10 SUPPOSITORY, RECTAL RECTAL ONCE AS NEEDED
Status: DISCONTINUED | OUTPATIENT
Start: 2025-02-07 | End: 2025-02-11

## 2025-02-07 RX ORDER — LIDOCAINE HCL/EPINEPHRINE/PF 2%-1:200K
VIAL (ML) INJECTION AS NEEDED
Status: DISCONTINUED | OUTPATIENT
Start: 2025-02-07 | End: 2025-02-07 | Stop reason: SURG

## 2025-02-07 RX ADMIN — SODIUM CHLORIDE, SODIUM LACTATE, POTASSIUM CHLORIDE, CALCIUM CHLORIDE: 600; 310; 30; 20 INJECTION, SOLUTION INTRAVENOUS at 15:03:00

## 2025-02-07 RX ADMIN — ONDANSETRON 4 MG: 2 INJECTION INTRAMUSCULAR; INTRAVENOUS at 14:33:00

## 2025-02-07 RX ADMIN — LIDOCAINE HYDROCHLORIDE 5 ML: 10 INJECTION, SOLUTION INFILTRATION; PERINEURAL at 04:41:00

## 2025-02-07 RX ADMIN — LIDOCAINE HCL/EPINEPHRINE/PF 15 ML: 2%-1:200K VIAL (ML) INJECTION at 14:10:00

## 2025-02-07 RX ADMIN — MORPHINE SULFATE 2.5 MG: 1 INJECTION, SOLUTION EPIDURAL; INTRATHECAL; INTRAVENOUS at 14:31:00

## 2025-02-07 RX ADMIN — SODIUM CHLORIDE, SODIUM LACTATE, POTASSIUM CHLORIDE, CALCIUM CHLORIDE: 600; 310; 30; 20 INJECTION, SOLUTION INTRAVENOUS at 14:04:00

## 2025-02-07 RX ADMIN — LIDOCAINE HYDROCHLORIDE AND EPINEPHRINE 5 ML: 15; 5 INJECTION, SOLUTION EPIDURAL at 04:41:00

## 2025-02-07 NOTE — DISCHARGE INSTRUCTIONS
Call your healthcare provider right away:   -fever over 100.4  -heavy vaginal bleeding   -severe or worsening pain unrelieved by medication   -severe anxiety or depression  - Dizziness and/or changes in vision  - severe headache,   - calf pain or swelling  - Chest pain or shortness of breath.     You are on pelvic rest for 6 weeks. This means nothing in vagina (no intercourse, tampons, etc.) and no baths or swimming pool.    No heavy lifting (no more than the baby) until cleared by OB.     If you had a , monitor your incision daily for any redness, swelling, drainage with foul odor, or warmth to touch.     Post  Section Home Care Instructions     We hope you were pleased with your care at [facility].  We wish you the best outcome and overall experience with the delivery of your baby.  These instructions will help to minimize pain, limit the risk for an infection, and improve the likelihood of a successful recovery.    What to Expect:  Abdominal cramping after delivery especially if you are breastfeeding.   Vaginal bleeding for about 4-6 weeks that may be followed by a yellow or white discharge for a few more weeks.  Your period will resume in approximately 6-8 weeks, unless you are breastfeeding.    If you are bottle feeding, you may notice breast engorgement in about 3 days.  Your breast may be sore and hard. Please wear a tight fitted bra or sports bra for 24-36 hours to help prevent your breast from producing milk, and use ice packs to relive any discomfort.  If you are breastfeeding, nipple dryness is very common the first few days.    Constipation is common after having a baby.  Please increase fluid and fiber in your diet.      Prescribed medication:  - prescriptions for gabapentin and ibuprofen were sent to your preferred pharmacy.   - take the gabapentin every 8 hours for the next 3-4 days, then you can back off and take only as needed for the rest of the week.  - take the  ibuprofen every 8 hours for the next 3-4 days (alternate with the gabapentin), then you can back off and take only as needed for pain while you continue healing.  - nifedipine 30 mg was prescribed to lower your blood pressure. Take this medicine every day in the morning.    Check your blood pressure daily - around the same time each day.  - if you have a headache that doesn't go away, or feel \"off\" check your blood pressure.  - if your BP is higher than 160/110, call our office [phone].    Over-The-Counter Medication  Non-prescription anti-inflammatory medications can also help to ease the pain.  You may take Tylenol  Colace or Metamucil for Constipation  Lanolin for dry nipples  Tucks, Witch Hazel and Epifoam for vaginal/perineum discomfort.   Drink a full glass of water with oral medication and take as directed.    Wound Care  The following instructions will promote proper healing and help to prevent infection  Please use soap and water over incision   Pat your incision dry and leave open to air if possible   There are stitches below your skin and a clear sticker over your skin. You can remove the clear sticker in 1 week if you would like. Otherwise we will removed it in 2 weeks at your incision check appointment.    Bathing/Showers  You may resume showers  No baths, swimming, hot tubs until your post-partum visit    Home Medication  Resume your home medications as instructed    Diet  Resume your normal diet    Activity  Refrain from vaginal intercourse, vaginal suppositories, tampon use or douches until after your post-partum visit  No exercising for 4 weeks  You may climb stairs minimally for the 1st week.    Do not do heavy housework for at least 2-3 weeks    Return to Work or School  You may return to work in 6-8 weeks  Contact your obstetrician’s office, if you need a medical release. ([phone])    Driving  Avoid driving for 1-2 weeks or sooner if not taking narcotics.    Follow-up Appointment with  Your Obstetrician  Call your obstetrician’s office today for an appointment in four weeks.    The number is [phone].  Verify your appointment date, day, time, and location.  At your 1st post-partum office visit:  Your progress will be evaluated, findings reviewed, and any additional concerns and instructions will be discussed.    Questions or Concerns  Call your obstetrician’s office if you experience the following:  Severe pain not controlled by pain medication  Foul smelling vaginal discharge  Heavy bleeding  Shortness of breath  Fever  Redness, increased swelling or drainage from your incision  Crying and periods of sadness that prevents you from caring for yourself and your baby  Burning sensation during urination or inability to urinate  Swelling, redness or abnormal warmth to your leg/calf  Please call [phone]. If your call is made after office hours, a physician will be available to help you.  There is always a provider covering our patients.    Thank you for coming to [facility] to start your new family.  The nurses, obstetricians, and the anesthesiologists try very hard to make sure you receive the best care possible.  Your trust in them as well as us is greatly appreciated.    Thanks so much,   [facility] Obstetrics and Gynecology   Psychiatry Referrals  [facility] (counseling and medication management)  Locations: [facility]   [phone]   If you are experiencing a mental health crisis, please call 911 or go to your nearest ED. If you are not in immediate danger but require emotional support and/or assistance, please contact: [facility] at [phone] or [facility] at [phone].

## 2025-02-07 NOTE — OPERATIVE REPORT
[facility]  part of [facility]     Section Delivery / Operative Note    Tommy Jim Patient Status:  Inpatient    10/14/1995 MRN [id number]   Location [facility] Attending Joseluis Rodriguez MD   Hosp Day # 1 PCP No primary care provider on file.     Pre Op Diagnosis:  IUP at 37w3d, Failure to Progress/Arrest of Descent    Post Op Diagnosis: preeclampsia without severe features     Same - Delivered    Procedure:  primary  LTCS     Indications:  Patient is a 29 year old year old  at 37w3d who presented for induction of labor for mild preeclampsia. Patient received cytotec followed by pitocin. Patient progressed to complete and started pushing at approximately 1030. No significant descent of vertex and position noted to be OP. Despite several position changes and good maternal effort, no descent of head beyond -2 to -3 and patient expressing significant fatigue. Patient requesting  section. The risks, benefits and alternatives were d/w patient including but not limited to risk of injury, infection, bleeding and subsequent  section deliveries. All questions and concerns were addressed. Patient provided verbal and written consent.     Surgeon:  Ольга Nicholas MD    Assistant Surgeon:  Maria Del Carmen Badillo MD The involvement of the assisting physician was necessary in order to provide aid in exposure/retraction, hemostasis, closure, and other intraoperative technical functions in order to facilitate me as the primary surgeon carry out a safe operation with optimized results/outcome.    Anesthesia: epidural     Complications: none     Antibiotics:  Yes ancef and Azithromycin preoperatively    QBL: 800 ml    IVF: 600mL    UO: 200 mL, clear    Specimens: Placenta, cord gases and cord blood    Findings: normal uterus, normal tubes bilaterally, normal ovaries bilaterally. Live male infant, Nuchal Cord:  none  clear amniotic fluid  noted.    Infant:  Date of Delivery: 2025   Time of Delivery: 2:27 PM  Delivery Type:      Infant Sex  Information for the patient's :  Diandra Girl [[id number]]   female    Infant Birthweight  Information for the patient's :  Darryn Jim [[id number]]   8 lb 5.3 oz (3.78 kg)     Apgars:  1 minute: 9               5 minutes: 9               Placenta  Delivery: spontaneous  Placenta to Pathology: no    Cord:  Cord Gases Submitted: no  Cord Blood/Tissue Collection: yes  Cord Complications: none    Procedure:   After informed consent was obtained, the patient was taken to the operating room, prepped and draped in the usual sterile fashion. SCDs and a taylor catheter were placed and anesthesia was found to be adequate. Two grams of ancef and azithromycin were given for infection prophylaxis. She was prepared and draped in the dorsal supine position with a leftward tilt. A time out was performed. A pfannenstiel skin incision was made and carried down to the fascia. The fascia was incised and extended laterally with Mayes scissors. The superior aspect of the fascia was grasped with kocher clamps, and the rectus muscle was dissected off bluntly then sharply with mayes scissors. In a similar fashion, the inferior aspect of the fascia was grasped with kocher clamps and the underlying rectus muscle was dissected off bluntly and then sharply with Mayes scissors. Hemostasis was achieved with the bovie. The rectus muscle was  in the midline down to the level of the pubic symphysis. The peritoneum was entered bluntly and extended laterally. There was good visualization of the bladder.     The bladder blade was inserted and the vesicouterine peritoneum identified. The lower uterine segment was identified. The lower uterine segment was incised with a scalpel. The amniotic sac was ruptured and clear fluid noted. The incision was extended bluntly with superior and inferior traction.     The fetus was in cephalic  presentation. The head was elevated out of the pelvis to the the hysterotomy site. Gentle fundal pressure was applied and the infant was delivered without difficulty. Delayed cord clamping for 30-60 seconts. The cord was clamped and cut. The infant was handed off to the pediatrician. IV oxytocin was initiated to facilitate uterine contractions. The placenta was delivered intact with manual massage of the uterine fundus. The inside of the uterus was wiped with a lap sponge to assure complete removal of placenta membranes. The uterine incision was closed with a 0-vicryl suture in a continuous running fashion. There was a figure of eight suture placed near the left side of the hysterotomy site placed for additional hemostasis. The uterus, tubes, and ovaries were normal appearing. The blood clots and fluid were wiped out of the abdomen and pelvis with moist laparotomy sponges. Re-inspection of the hysterotomy, peritomeum and rectus muscles was noted to be entirely hemostatic.    The fascia was closed with 0-vicryl suture in a running fashion. The subcutaneous tissue was closed with 2-0 plain catgut suture. The subcutaneous tissue was copiously irrigated and any bleeding cauterized. The skin was re-approximated with 4-0 Vicryl on Reymundo Needlehe patient tolerated the procedure well. All sponge, instrument and needle counts were correct x3. The patient toelrated the procedure well and was taken to the recovery room in a stable and satisfactory condition. The baby was in stable condition to the recovery room.     Specimen:  none    Drains: taylor to dependant drainage    Condition:   stable    Complications: None; patient tolerated the procedure well.      MD ELIZABETH Olvera

## 2025-02-07 NOTE — ANESTHESIA PROCEDURE NOTES
Labor Analgesia    Date/Time: 2/7/2025 4:41 AM    Performed by: Cherrie Hawthorne MD  Authorized by: Cherrie Hawthorne MD      General Information and Staff    Start Time:  2/7/2025 4:41 AM  End Time:  2/7/2025 4:55 AM  Anesthesiologist:  Cherrie Hawthorne MD  Performed by:  Anesthesiologist  Patient Location:  OB  Site Identification: surface landmarks    Reason for Block: labor epidural    Preanesthetic Checklist: patient identified, IV checked, site marked, risks and benefits discussed, monitors and equipment checked, pre-op evaluation, timeout performed, IV bolus, anesthesia consent and sterile technique used      Procedure Details    Patient Position:  Sitting  Prep: ChloraPrep and patient draped    Monitoring:  Heart rate, cardiac monitor and continuous pulse ox  Approach:  Midline    Epidural Needle    Injection Technique:  SHANNAN air  Needle Type:  Tuohy  Needle Gauge:  18 G  Needle Length:  3.5 in  Needle Insertion Depth:  5  Location:  L3-4    Spinal Needle      Catheter    Catheter Type:  Multi-orifice  Catheter Size:  20 G  Catheter at Skin Depth:  12  Test Dose:  Negative    Assessment      Additional Comments

## 2025-02-07 NOTE — ANESTHESIA POSTPROCEDURE EVALUATION
Patient: Tommy Jim    Procedure Summary       Date: 25 Room / Location: [facility] L+D OR  / [facility] L+D OR    Anesthesia Start: 441 Anesthesia Stop:     Procedure:  SECTION (Abdomen) Diagnosis: (Failure to Progress)    Surgeons: Ольга Nicholas MD Anesthesiologist: Babs Graves MD    Anesthesia Type: epidural ASA Status: 2            Anesthesia Type: epidural    Vitals Value Taken Time   /40 25 1504   Temp 98.9 °F (37.2 °C) 25 1504   Pulse 114 25 1505   Resp 18 25 1504   SpO2 99 % 25 1505   Vitals shown include unfiled device data.    [facility] AN Post Evaluation:   Patient Evaluated in PACU  Patient Participation: complete - patient participated  Level of Consciousness: awake  Pain Management: adequate  Airway Patency:patent  Dental exam unchanged from preop  Yes    Cardiovascular Status: acceptable  Respiratory Status: acceptable  Postoperative Hydration acceptable      BABS GRAVES MD  2025 3:06 PM

## 2025-02-07 NOTE — ANESTHESIA POSTPROCEDURE EVALUATION
Patient: Tommy Jim    Procedure Summary       Date: 02/07/25 Room / Location:     Anesthesia Start: 0441 Anesthesia Stop:     Procedure: LABOR ANALGESIA Diagnosis:     Scheduled Providers:  Anesthesiologist: Babs Graves MD    Anesthesia Type: epidural ASA Status: 2            Anesthesia Type: epidural    Vitals Value Taken Time   /40 02/07/25 1504   Temp 98.9 °F (37.2 °C) 02/07/25 1504   Pulse 106 02/07/25 1504   Resp 18 02/07/25 1504   SpO2 94 % 02/07/25 1504       [facility] AN Post Evaluation:   Patient Evaluated in PACU  Patient Participation: complete - patient participated  Level of Consciousness: awake  Pain Management: adequate  Airway Patency:patent  Dental exam unchanged from preop  Yes    Cardiovascular Status: acceptable  Respiratory Status: acceptable  Postoperative Hydration acceptable      BABS GRAVES MD  2/7/2025 3:05 PM

## 2025-02-07 NOTE — ANESTHESIA PREPROCEDURE EVALUATION
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, [id number]-njcoyod Alpha Thalassemia, HBA1/HBA2-related; partner is negative 2024    Encntr for suprvsn of normal first pregnancy, unsp trimester ([facility]) 2024       Past Medical History:    Anxiety    Depression    Patient denies medical problems       Past Surgical History:   Procedure Laterality Date    Patient denies any surgical history         Prescriptions Prior to Admission[1]  Current Medications and Prescriptions Ordered in Epic[2]    Allergies[3]    Family History   Problem Relation Age of Onset    Diabetes Father     Hypertension Father     Hypertension Mother     Hyperlipidemia Mother     Diabetes Mother     No Known Problems Maternal Grandmother     No Known Problems Maternal Grandfather     No Known Problems Paternal Grandmother     No Known Problems Paternal Grandfather     No Known Problems Brother     Depression Sister      Social History     Socioeconomic History    Marital status:    Tobacco Use    Smoking status: Never    Smokeless tobacco: Never   Substance and Sexual Activity    Alcohol use: Not Currently    Drug use: Never    Sexual activity: Yes   Other Topics Concern    Blood Transfusions No       Available pre-op labs reviewed.  Lab Results   Component Value Date    WBC 8.2 2025    RBC 4.46 2025    HGB 11.5 (L) 2025    HCT 36.4 2025    MCV 81.6 2025    MCH 25.8 (L) 2025     MCHC 31.6 02/06/2025    RDW 14.6 02/06/2025    .0 02/06/2025     Lab Results   Component Value Date     02/06/2025    K 4.3 02/06/2025     02/06/2025    CO2 19.0 (L) 02/06/2025    BUN 11 02/06/2025    CREATSERUM 0.63 02/06/2025    GLU 90 02/06/2025    CA 8.8 02/06/2025          Vital Signs:  Body mass index is 29.76 kg/m².   weight is 86.2 kg (190 lb). Her oral temperature is 98 °F (36.7 °C). Her blood pressure is 139/89 and her pulse is 90. Her respiration is 24.   Vitals:    02/06/25 1700 02/06/25 1915 02/06/25 2306 02/07/25 0315   BP: 134/85 138/85 (!) 142/92 139/89   Pulse: 102  92 90   Resp: 18 20 22 24   Temp: 99.4 °F (37.4 °C) 98.8 °F (37.1 °C) 98.6 °F (37 °C) 98 °F (36.7 °C)   TempSrc: Oral Oral Oral Oral   Weight: 86.2 kg (190 lb)           Anesthesia Evaluation     Patient summary reviewed and Nursing notes reviewed    Airway   Mallampati: II  TM distance: >3 FB  Neck ROM: full  Dental      Pulmonary - negative ROS and normal exam   Cardiovascular - negative ROS and normal exam    NYHA Classification: I    Neuro/Psych    (+)  anxiety/panic attacks,  depression      GI/Hepatic/Renal - negative ROS     Endo/Other - negative ROS   Abdominal  - normal exam     Other findings: 02/06/25 09:57  Glucose: 90  Sodium: 137  Potassium: 4.3  Chloride: 107  Carbon Dioxide, Total: 19.0 (L)  BUN: 11  CREATININE: 0.63  CALCIUM: 8.8  BUN/CREATININE RATIO: 17.5  EGFR: 122  ANION GAP: 11  CALCULATED OSMOLALITY: 283  ALKALINE PHOSPHATASE: 266 (H)  02/06/25 09:57  WBC: 8.2  Hemoglobin: 11.5 (L)  Hematocrit: 36.4  Platelet Count: 236.0      (L): Data is abnormally low    (L): Data is abnormally low  (H): Data is abnormally high            Anesthesia Plan:   ASA:  2  Plan:   Epidural  Informed Consent Plan and Risks Discussed With:  Patient  Discussed plan with:  Attending and surgeon      I have informed Tommy Jim and/or legal guardian or family member of the nature of the anesthetic plan, benefits, risks  including possible dental damage if relevant, major complications, and any alternative forms of anesthetic management.   All of the patient's questions were answered to the best of my ability. The patient desires the anesthetic management as planned.  I have informed this Tommy Jim [relative] of the risks of neuraxial anesthesia including, but not limited to: failure,headache, backache, spinal, unilateral/patchy block, difficulty breathing, infection, bleeding, nerve damage, paralysis, death. The patient desires the proposed neuraxial anesthetic as planned.    OFELIA SCHMITZ MD  2/7/2025 4:39 AM  Present on Admission:  **None**           [1]   Medications Prior to Admission   Medication Sig Dispense Refill Last Dose/Taking    prenatal vitamin with DHA 27-0.8-228 MG Oral Cap Take 1 capsule by mouth daily.   Taking    hydrocortisone 2.5 % External Cream Place 1 Application rectally daily. 28 g 1     famotidine (PEPCID AC MAXIMUM STRENGTH) 20 MG Oral Tab Take 1 tablet (20 mg total) by mouth 2 (two) times daily as needed for Heartburn. 20 tablet 1     hydrocortisone 2.5 % External Cream Place rectally.       hydrocortisone 25 MG Rectal Suppos Place 1 suppository (25 mg total) rectally 3 (three) times daily as needed.       Wheat Dextrin (BENEFIBER) Oral Powder Take 3 g by mouth daily.      [2]   Current Facility-Administered Medications Ordered in Epic   Medication Dose Route Frequency Provider Last Rate Last Admin    fentaNYL-bupivacaine 2 mcg/mL-0.125% in sodium chloride 0.9% 100 mL EPIDURAL infusion premix   Epidural Continuous Ofelia Schmitz MD        fentaNYL (Sublimaze) 50 mcg/mL injection 100 mcg  100 mcg Epidural Once Ofelia Schmitz MD        bupivacaine PF (Marcaine) 0.25% injection  20 mL Epidural Once Ofelia Schmitz MD        ePHEDrine (PF) 25 MG/5 ML injection 5 mg  5 mg Intravenous PRN Ofelia Schmitz MD        nalbuphine (Nubain) 10 mg/mL injection 2.5 mg  2.5 mg Intravenous Q15 Min PRN Ofelia Schmitz MD         dextrose in lactated ringers 5% infusion   Intravenous Continuous Joseluis Rodriguez  mL/hr at 02/06/25 2121 New Bag at 02/06/25 2121    lactated ringers infusion   Intravenous PRN Joseluis Rodriguez MD        lactated ringers IV bolus 500 mL  500 mL Intravenous PRN Joseluis Rodriguez MD        acetaminophen (Tylenol Extra Strength) tab 500 mg  500 mg Oral Q6H PRN Joseluis Rodriguez MD        acetaminophen (Tylenol Extra Strength) tab 1,000 mg  1,000 mg Oral Q6H PRN Joseluis Rodriguez MD        ibuprofen (Motrin) tab 600 mg  600 mg Oral Once PRN Joseluis Rodriguez MD        ondansetron (Zofran) 4 MG/2ML injection 4 mg  4 mg Intravenous Q6H PRN Joseluis Rodriguez MD        oxyTOCIN in sodium chloride 0.9% (Pitocin) 30 Units/500mL infusion premix  62.5-900 paulino-units/min Intravenous Continuous Joseluis Rodriguez MD        terbutaline (Brethine) 1 MG/ML injection 0.25 mg  0.25 mg Subcutaneous PRN Joseluis Rodriguez MD        sodium citrate-citric acid (Bicitra) 500-334 MG/5ML oral solution 30 mL  30 mL Oral PRN Joseluis Rodriguez MD        lidocaine PF (Xylocaine-MPF) 1% injection  30 mL Intradermal Once Joseluis Rodriguez MD        fentaNYL (Sublimaze) 50 mcg/mL injection 100 mcg  100 mcg Intravenous Once Joseluis Rodriguez MD        fentaNYL (Sublimaze) 50 mcg/mL injection 50 mcg  50 mcg Intravenous Q30 Min PRN Joseluis Rodriguez MD        misoprostol (CYTOTEC) partial tablets 25 mcg  25 mcg Vaginal Q4H Joseluis Rodriguez MD   25 mcg at 02/07/25 0115    benzonatate (Tessalon) cap 100 mg  100 mg Oral TID PRN Joseluis Rodriguez MD   100 mg at 02/07/25 0121     No current Epic-ordered outpatient medications on file.   [3] No Known Allergies

## 2025-02-07 NOTE — PLAN OF CARE
Problem: BIRTH - VAGINAL/ SECTION  Goal: Fetal and maternal status remain reassuring during the birth process  Description: INTERVENTIONS:  - Monitor vital signs  - Monitor fetal heart rate  - Monitor uterine activity  - Monitor labor progression (vaginal delivery)  - DVT prophylaxis (C/S delivery)  - Surgical antibiotic prophylaxis (C/S delivery)  Outcome: Progressing     Problem: PAIN - ADULT  Goal: Verbalizes/displays adequate comfort level or patient's stated pain goal  Description: INTERVENTIONS:  - Encourage pt to monitor pain and request assistance  - Assess pain using appropriate pain scale  - Administer analgesics based on type and severity of pain and evaluate response  - Implement non-pharmacological measures as appropriate and evaluate response  - Consider cultural and social influences on pain and pain management  - Manage/alleviate anxiety  - Utilize distraction and/or relaxation techniques  - Monitor for opioid side effects  - Notify MD/LIP if interventions unsuccessful or patient reports new pain  - Anticipate increased pain with activity and pre-medicate as appropriate  Outcome: Progressing     Problem: ANXIETY  Goal: Will report anxiety at manageable levels  Description: INTERVENTIONS:  - Administer medication as ordered  - Teach and rehearse alternative coping skills  - Provide emotional support with 1:1 interaction with staff  Outcome: Progressing     Problem: Patient Centered Care  Goal: Patient preferences are identified and integrated in the patient's plan of care  Description: Interventions:  - What would you like us to know as we care for you? This is My first baby, who is a girl!!!  - Provide timely, complete, and accurate information to patient/family  - Incorporate patient and family knowledge, values, beliefs, and cultural backgrounds into the planning and delivery of care  - Encourage patient/family to participate in care and decision-making at the level they choose  - Honor  patient and family perspectives and choices  Outcome: Progressing     Problem: Patient/Family Goals  Goal: Patient/Family Long Term Goal  Description: Patient's Long Term Goal: uncomplicated birth     Interventions:  - See additional Care Plan goals for specific interventions  Outcome: Progressing  Goal: Patient/Family Short Term Goal  Description: Patient's Short Term Goal: Pain management  Interventions:   - Educated patient on epidural if patient decides they want it  -Non-pharmacological pain intervention; heat packs, etc.  -Pain medications as requested per order  -Pain assessments  - See additional Care Plan goals for specific interventions  Outcome: Progressing     Problem: RESPIRATORY - ADULT  Goal: Achieves optimal ventilation and oxygenation  Description: INTERVENTIONS:  - Assess for changes in respiratory status  - Assess for changes in mentation and behavior  - Position to facilitate oxygenation and minimize respiratory effort  - Oxygen supplementation based on oxygen saturation or ABGs  - Provide Smoking Cessation handout, if applicable  - Encourage broncho-pulmonary hygiene including cough, deep breathe, Incentive Spirometry  - Assess the need for suctioning and perform as needed  - Assess and instruct to report SOB or any respiratory difficulty  - Respiratory Therapy support as indicated  - Manage/alleviate anxiety  - Monitor for signs/symptoms of CO2 retention  Outcome: Progressing

## 2025-02-07 NOTE — PROGRESS NOTES
Decision for  section:  Patient admitted  for induction of labor for mild preeclampsia. Patient received cytotec followed by pitocin. Patient progressed to complete and started pushing at approximately 1030. No significant descent of vertex and position noted to be OP. Despite several position changes and good maternal effort, no descent of head beyond -2 to -3 and patient expressing significant fatigue. Patient requesting  section. Risks/benefits/procedure and anticipated recovery reviewed.  Consent signed.     Ольга Nicholas MD

## 2025-02-08 LAB
BASOPHILS # BLD AUTO: 0.03 X10(3) UL (ref 0–0.2)
BASOPHILS NFR BLD AUTO: 0.3 %
DEPRECATED RDW RBC AUTO: 45 FL (ref 35.1–46.3)
EOSINOPHIL # BLD AUTO: 0.03 X10(3) UL (ref 0–0.7)
EOSINOPHIL NFR BLD AUTO: 0.3 %
ERYTHROCYTE [DISTWIDTH] IN BLOOD BY AUTOMATED COUNT: 14.8 % (ref 11–15)
HCT VFR BLD AUTO: 24.7 %
HGB BLD-MCNC: 7.7 G/DL
IMM GRANULOCYTES # BLD AUTO: 0.06 X10(3) UL (ref 0–1)
IMM GRANULOCYTES NFR BLD: 0.5 %
LYMPHOCYTES # BLD AUTO: 1.26 X10(3) UL (ref 1–4)
LYMPHOCYTES NFR BLD AUTO: 11.3 %
MCH RBC QN AUTO: 26.5 PG (ref 26–34)
MCHC RBC AUTO-ENTMCNC: 31.2 G/DL (ref 31–37)
MCV RBC AUTO: 84.9 FL
MONOCYTES # BLD AUTO: 0.96 X10(3) UL (ref 0.1–1)
MONOCYTES NFR BLD AUTO: 8.6 %
NEUTROPHILS # BLD AUTO: 8.81 X10 (3) UL (ref 1.5–7.7)
NEUTROPHILS # BLD AUTO: 8.81 X10(3) UL (ref 1.5–7.7)
NEUTROPHILS NFR BLD AUTO: 79 %
PLATELET # BLD AUTO: 158 10(3)UL (ref 150–450)
RBC # BLD AUTO: 2.91 X10(6)UL
WBC # BLD AUTO: 11.2 X10(3) UL (ref 4–11)

## 2025-02-08 NOTE — PLAN OF CARE
Patient received into room 368 via cart. Bedside report received from KARSON Dillard. Patient transferred to bed without incident. Bed locked and in low position. Side rails up x2. Fundus firm, U/U, lochia small, no clots noted. IV site unremarkable. Pain level 5/10. Baby present at bedside in open crib, ID bands verified. Patient/family oriented to unit, room, and call light. Call light within patient reach. Reinforced to call for assistance before getting out of bed. Plan of care reviewed, patient verbalized understanding.    Problem: POSTPARTUM  Goal: Long Term Goal:Experiences normal postpartum course  Description: INTERVENTIONS:  - Assess and monitor vital signs and lab values.  - Assess fundus and lochia.  - Provide ice/sitz baths for perineum discomfort.  - Monitor healing of incision/episiotomy/laceration, and assess for signs and symptoms of infection and hematoma.  - Assess bladder function and monitor for bladder distention.  - Provide/instruct/assist with pericare as needed.  - Provide VTE prophylaxis as needed.  - Monitor bowel function.  - Encourage ambulation and provide assistance as needed.  - Assess and monitor emotional status and provide social service/psych resources as needed.  - Utilize standard precautions and use personal protective equipment as indicated. Ensure aseptic care of all intravenous lines and invasive tubes/drains.  - Obtain immunization and exposure to communicable diseases history.  Outcome: Progressing  Goal: Experiences normal breast weaning course  Description: INTERVENTIONS:  - Assess for and manage engorgement.  - Instruct on breast care.  - Provide comfort measures.  Outcome: Progressing  Goal: Appropriate maternal -  bonding  Description: INTERVENTIONS:  - Assess caregiver- interactions.  - Assess caregiver's emotional status and coping mechanisms.  - Encourage caregiver to participate in  daily care.  - Assess support systems available to  mother/family.  - Provide /case management support as needed.  Outcome: Progressing

## 2025-02-08 NOTE — PROGRESS NOTES
Patient transferred to mother/baby room 368 per cart in stable condition with baby and personal belongings.  Accompanied by  and staff.  Report given to Gail mother/baby KARSON.

## 2025-02-08 NOTE — PLAN OF CARE
Problem: BIRTH - VAGINAL/ SECTION  Goal: Fetal and maternal status remain reassuring during the birth process  Description: INTERVENTIONS:  - Monitor vital signs  - Monitor fetal heart rate  - Monitor uterine activity  - Monitor labor progression (vaginal delivery)  - DVT prophylaxis (C/S delivery)  - Surgical antibiotic prophylaxis (C/S delivery)  Outcome: Progressing     Problem: PAIN - ADULT  Goal: Verbalizes/displays adequate comfort level or patient's stated pain goal  Description: INTERVENTIONS:  - Encourage pt to monitor pain and request assistance  - Assess pain using appropriate pain scale  - Administer analgesics based on type and severity of pain and evaluate response  - Implement non-pharmacological measures as appropriate and evaluate response  - Consider cultural and social influences on pain and pain management  - Manage/alleviate anxiety  - Utilize distraction and/or relaxation techniques  - Monitor for opioid side effects  - Notify MD/LIP if interventions unsuccessful or patient reports new pain  - Anticipate increased pain with activity and pre-medicate as appropriate  Outcome: Progressing     Problem: ANXIETY  Goal: Will report anxiety at manageable levels  Description: INTERVENTIONS:  - Administer medication as ordered  - Teach and rehearse alternative coping skills  - Provide emotional support with 1:1 interaction with staff  Outcome: Progressing     Problem: RESPIRATORY - ADULT  Goal: Achieves optimal ventilation and oxygenation  Description: INTERVENTIONS:  - Assess for changes in respiratory status  - Assess for changes in mentation and behavior  - Position to facilitate oxygenation and minimize respiratory effort  - Oxygen supplementation based on oxygen saturation or ABGs  - Provide Smoking Cessation handout, if applicable  - Encourage broncho-pulmonary hygiene including cough, deep breathe, Incentive Spirometry  - Assess the need for suctioning and perform as needed  - Assess and  instruct to report SOB or any respiratory difficulty  - Respiratory Therapy support as indicated  - Manage/alleviate anxiety  - Monitor for signs/symptoms of CO2 retention  Outcome: Progressing     Problem: GASTROINTESTINAL - ADULT  Goal: Minimal or absence of nausea and vomiting  Description: INTERVENTIONS:  - Maintain adequate hydration with IV or PO as ordered and tolerated  - Nasogastric tube to low intermittent suction as ordered  - Evaluate effectiveness of ordered antiemetic medications  - Provide nonpharmacologic comfort measures as appropriate  - Advance diet as tolerated, if ordered  - Obtain nutritional consult as needed  - Evaluate fluid balance  Outcome: Progressing  Goal: Maintains or returns to baseline bowel function  Description: INTERVENTIONS:  - Assess bowel function  - Maintain adequate hydration with IV or PO as ordered and tolerated  - Evaluate effectiveness of GI medications  - Encourage mobilization and activity  - Obtain nutritional consult as needed  - Establish a toileting routine/schedule  - Consider collaborating with pharmacy to review patient's medication profile  Outcome: Progressing  Goal: Maintains adequate nutritional intake (undernourished)  Description: INTERVENTIONS:  - Monitor percentage of each meal consumed  - Identify factors contributing to decreased intake, treat as appropriate  - Assist with meals as needed  - Monitor I&O, WT and lab values  - Obtain nutritional consult as needed  - Optimize oral hygiene and moisture  - Encourage food from home; allow for food preferences  - Enhance eating environment  Outcome: Progressing     Problem: GENITOURINARY - ADULT  Goal: Absence of urinary retention  Description: INTERVENTIONS:  - Assess patient’s ability to void and empty bladder  - Monitor intake/output and perform bladder scan as needed  - Follow urinary retention protocol/standard of care  - Consider collaborating with pharmacy to review patient's medication profile  -  Implement strategies to promote bladder emptying  Outcome: Progressing     Problem: POSTPARTUM  Goal: Optimize infant feeding at the breast  Description: INTERVENTIONS:  - Initiate breast feeding within first hour after birth.   - Monitor effectiveness of current breast feeding efforts.  - Assess support systems available to mother/family.  - Identify cultural beliefs/practices regarding lactation, letdown techniques, maternal food preferences.  - Assess mother's knowledge and previous experience with breast feeding.  - Provide information as needed about early infant feeding cues (e.g., rooting, lip smacking, sucking fingers/hand) versus late cue of crying.  - Discuss/demonstrate breast feeding aids (e.g., infant sling, nursing footstool/pillows, and breast pumps).  - Encourage mother/other family members to express feelings/concerns, and actively listen.  - Educate father/SO about benefits of breast feeding and how to manage common lactation challenges.  - Recommend avoidance of specific medications or substances incompatible with breast feeding.  - Assess and monitor for signs of nipple pain/trauma.  - Instruct and provide assistance with proper latch.  - Review techniques for milk expression (breast pumping) and storage of breast milk. Provide pumping equipment/supplies, instructions and assistance, as needed.  - Encourage rooming-in and breast feeding on demand.  - Encourage skin-to-skin contact.  - Provide LC support as needed.  - Assess for and manage engorgement.  - Provide breast feeding education handouts and information on community breast feeding support.   Outcome: Progressing  Goal: Establishment of adequate milk supply with medication/procedure interruptions  Description: INTERVENTIONS:  - Review techniques for milk expression (breast pumping).   - Provide pumping equipment/supplies, instructions, and assistance until it is safe to breastfeed infant.  Outcome: Progressing  Goal: Appropriate maternal -   bonding  Description: INTERVENTIONS:  - Assess caregiver- interactions.  - Assess caregiver's emotional status and coping mechanisms.  - Encourage caregiver to participate in  daily care.  - Assess support systems available to mother/family.  - Provide /case management support as needed.  Outcome: Progressing

## 2025-02-08 NOTE — PLAN OF CARE
Problem: RESPIRATORY - ADULT  Goal: Achieves optimal ventilation and oxygenation  Description: INTERVENTIONS:  - Assess for changes in respiratory status  - Assess for changes in mentation and behavior  - Position to facilitate oxygenation and minimize respiratory effort  - Oxygen supplementation based on oxygen saturation or ABGs  - Provide Smoking Cessation handout, if applicable  - Encourage broncho-pulmonary hygiene including cough, deep breathe, Incentive Spirometry  - Assess the need for suctioning and perform as needed  - Assess and instruct to report SOB or any respiratory difficulty  - Respiratory Therapy support as indicated  - Manage/alleviate anxiety  - Monitor for signs/symptoms of CO2 retention  Outcome: Progressing     Problem: GASTROINTESTINAL - ADULT  Goal: Minimal or absence of nausea and vomiting  Description: INTERVENTIONS:  - Maintain adequate hydration with IV or PO as ordered and tolerated  - Nasogastric tube to low intermittent suction as ordered  - Evaluate effectiveness of ordered antiemetic medications  - Provide nonpharmacologic comfort measures as appropriate  - Advance diet as tolerated, if ordered  - Obtain nutritional consult as needed  - Evaluate fluid balance  Outcome: Progressing  Goal: Maintains or returns to baseline bowel function  Description: INTERVENTIONS:  - Assess bowel function  - Maintain adequate hydration with IV or PO as ordered and tolerated  - Evaluate effectiveness of GI medications  - Encourage mobilization and activity  - Obtain nutritional consult as needed  - Establish a toileting routine/schedule  - Consider collaborating with pharmacy to review patient's medication profile  Outcome: Progressing  Goal: Maintains adequate nutritional intake (undernourished)  Description: INTERVENTIONS:  - Monitor percentage of each meal consumed  - Identify factors contributing to decreased intake, treat as appropriate  - Assist with meals as needed  - Monitor I&O, WT and lab  values  - Obtain nutritional consult as needed  - Optimize oral hygiene and moisture  - Encourage food from home; allow for food preferences  - Enhance eating environment  Outcome: Progressing     Problem: POSTPARTUM  Goal: Long Term Goal:Experiences normal postpartum course  Description: INTERVENTIONS:  - Assess and monitor vital signs and lab values.  - Assess fundus and lochia.  - Provide ice/sitz baths for perineum discomfort.  - Monitor healing of incision/episiotomy/laceration, and assess for signs and symptoms of infection and hematoma.  - Assess bladder function and monitor for bladder distention.  - Provide/instruct/assist with pericare as needed.  - Provide VTE prophylaxis as needed.  - Monitor bowel function.  - Encourage ambulation and provide assistance as needed.  - Assess and monitor emotional status and provide social service/psych resources as needed.  - Utilize standard precautions and use personal protective equipment as indicated. Ensure aseptic care of all intravenous lines and invasive tubes/drains.  - Obtain immunization and exposure to communicable diseases history.  Outcome: Progressing  Goal: Optimize infant feeding at the breast  Description: INTERVENTIONS:  - Initiate breast feeding within first hour after birth.   - Monitor effectiveness of current breast feeding efforts.  - Assess support systems available to mother/family.  - Identify cultural beliefs/practices regarding lactation, letdown techniques, maternal food preferences.  - Assess mother's knowledge and previous experience with breast feeding.  - Provide information as needed about early infant feeding cues (e.g., rooting, lip smacking, sucking fingers/hand) versus late cue of crying.  - Discuss/demonstrate breast feeding aids (e.g., infant sling, nursing footstool/pillows, and breast pumps).  - Encourage mother/other family members to express feelings/concerns, and actively listen.  - Educate father/SO about benefits of breast  feeding and how to manage common lactation challenges.  - Recommend avoidance of specific medications or substances incompatible with breast feeding.  - Assess and monitor for signs of nipple pain/trauma.  - Instruct and provide assistance with proper latch.  - Review techniques for milk expression (breast pumping) and storage of breast milk. Provide pumping equipment/supplies, instructions and assistance, as needed.  - Encourage rooming-in and breast feeding on demand.  - Encourage skin-to-skin contact.  - Provide LC support as needed.  - Assess for and manage engorgement.  - Provide breast feeding education handouts and information on community breast feeding support.   Outcome: Progressing  Goal: Establishment of adequate milk supply with medication/procedure interruptions  Description: INTERVENTIONS:  - Review techniques for milk expression (breast pumping).   - Provide pumping equipment/supplies, instructions, and assistance until it is safe to breastfeed infant.  Outcome: Progressing  Goal: Experiences normal breast weaning course  Description: INTERVENTIONS:  - Assess for and manage engorgement.  - Instruct on breast care.  - Provide comfort measures.  Outcome: Progressing  Goal: Appropriate maternal -  bonding  Description: INTERVENTIONS:  - Assess caregiver- interactions.  - Assess caregiver's emotional status and coping mechanisms.  - Encourage caregiver to participate in  daily care.  - Assess support systems available to mother/family.  - Provide /case management support as needed.  Outcome: Progressing

## 2025-02-08 NOTE — PROGRESS NOTES
POSTPARTUM PROGRESS NOTE  Date: 2025    Subjective: Patient feeling well overall. She denies any pain. Has not yet ambulated. No excessive vaginal bleeding. Tolerating regular diet. She does not plan to breastfeed.     Vitals  Vitals:    25 0548 25 0600 25 0700 25 0800   BP:    117/88   BP Location:    Left arm   Pulse: 73 82 72 69   Resp:    16   Temp:    98.2 °F (36.8 °C)   TempSrc:    Oral   SpO2: 100% 100% 96% 99%   Weight:             PHYSICAL EXAM  General: resting in bed, no acute distress  Chest: no increasing work of breathing  Abdomen: fundus firm, incision c/d/I with Dermabond and Tegaderm  Genitourinary: minimal lochia  Extremities: lower extremities symmetric bilaterally     A/P: Tommy Jim is a 29 year old  POD#1 s/p pLTCS at 37w3d for arrest of descent during IOL for preE without severe features.     #Routine postpartum care  -Pain: Tylenol and Motrin scheduled, gabapentin PRN for breakthrough pain  -Hemodynamics:   ---acute blood loss with anemia  ---11.7 > EBL 870mL > 7.7  ---iron supplementation: venofer x1 dose today followed by daily oral iron  -Infant: baby girl doing well  -Infant feeding: formula  --patient not interested in breastfeeding. Recommend breast binding and ice and avoidance of nipple stimulation. Will monitor for engorgement and mastitis    #Pre-eclampsia without severe features  -mild range BP intrapartum, normotensive this AM  -asymptomatic with normal HELLP labs  -will continue to monitor for severe features    Disposition: anticipate discharge home on POD#3-4    Era Sanchez DO

## 2025-02-08 NOTE — ANESTHESIA POST-OP FOLLOW-UP NOTE
Patient: Tommy Jim    Procedure Summary       Date: 25 Room / Location: [facility] L+D OR  / [facility] L+D OR    Anesthesia Start: 441 Anesthesia Stop:     Procedure:  SECTION (Abdomen) Diagnosis: (Failure to Progress)    Surgeons: Ольга Nicholas MD Anesthesiologist: Babs Graves MD    Anesthesia Type: epidural ASA Status: 2            Anesthesia Type: epidural    Vitals Value Taken Time   /88 25 0810   Temp 97.7 °F (36.5 °C) 25 0400   Pulse 69 25 0810   Resp 16 25 0400   SpO2 97 % 25 0808   Vitals shown include unfiled device data.      Adequate pain control, recovered motor/sensory function, hemodynamically stable, no nausea, passed urine.    [facility] AN Post Evaluation    Donald Johns MD  2025 8:14 AM

## 2025-02-09 RX ORDER — SERTRALINE HYDROCHLORIDE 25 MG/1
25 TABLET, FILM COATED ORAL DAILY
Status: DISCONTINUED | OUTPATIENT
Start: 2025-02-10 | End: 2025-02-11

## 2025-02-09 NOTE — PLAN OF CARE
Problem: PAIN - ADULT  Goal: Verbalizes/displays adequate comfort level or patient's stated pain goal  Description: INTERVENTIONS:  - Encourage pt to monitor pain and request assistance  - Assess pain using appropriate pain scale  - Administer analgesics based on type and severity of pain and evaluate response  - Implement non-pharmacological measures as appropriate and evaluate response  - Consider cultural and social influences on pain and pain management  - Manage/alleviate anxiety  - Utilize distraction and/or relaxation techniques  - Monitor for opioid side effects  - Notify MD/LIP if interventions unsuccessful or patient reports new pain  - Anticipate increased pain with activity and pre-medicate as appropriate  Outcome: Progressing     Problem: ANXIETY  Goal: Will report anxiety at manageable levels  Description: INTERVENTIONS:  - Administer medication as ordered  - Teach and rehearse alternative coping skills  - Provide emotional support with 1:1 interaction with staff  Outcome: Progressing     Problem: GASTROINTESTINAL - ADULT  Goal: Minimal or absence of nausea and vomiting  Description: INTERVENTIONS:  - Maintain adequate hydration with IV or PO as ordered and tolerated  - Nasogastric tube to low intermittent suction as ordered  - Evaluate effectiveness of ordered antiemetic medications  - Provide nonpharmacologic comfort measures as appropriate  - Advance diet as tolerated, if ordered  - Obtain nutritional consult as needed  - Evaluate fluid balance  Outcome: Progressing  Goal: Maintains or returns to baseline bowel function  Description: INTERVENTIONS:  - Assess bowel function  - Maintain adequate hydration with IV or PO as ordered and tolerated  - Evaluate effectiveness of GI medications  - Encourage mobilization and activity  - Obtain nutritional consult as needed  - Establish a toileting routine/schedule  - Consider collaborating with pharmacy to review patient's medication profile  Outcome:  Progressing  Goal: Maintains adequate nutritional intake (undernourished)  Description: INTERVENTIONS:  - Monitor percentage of each meal consumed  - Identify factors contributing to decreased intake, treat as appropriate  - Assist with meals as needed  - Monitor I&O, WT and lab values  - Obtain nutritional consult as needed  - Optimize oral hygiene and moisture  - Encourage food from home; allow for food preferences  - Enhance eating environment  Outcome: Progressing  Goal: Achieves appropriate nutritional intake (bariatric)  Description: INTERVENTIONS:  - Monitor for over-consumption  - Identify factors contributing to increased intake, treat as appropriate  - Monitor I&O, WT and lab values  - Obtain nutritional consult as needed  - Evaluate psychosocial factors contributing to over-consumption  Outcome: Progressing     Problem: GENITOURINARY - ADULT  Goal: Absence of urinary retention  Description: INTERVENTIONS:  - Assess patient’s ability to void and empty bladder  - Monitor intake/output and perform bladder scan as needed  - Follow urinary retention protocol/standard of care  - Consider collaborating with pharmacy to review patient's medication profile  - Implement strategies to promote bladder emptying  Outcome: Progressing     Problem: POSTPARTUM  Goal: Long Term Goal:Experiences normal postpartum course  Description: INTERVENTIONS:  - Assess and monitor vital signs and lab values.  - Assess fundus and lochia.  - Provide ice/sitz baths for perineum discomfort.  - Monitor healing of incision/episiotomy/laceration, and assess for signs and symptoms of infection and hematoma.  - Assess bladder function and monitor for bladder distention.  - Provide/instruct/assist with pericare as needed.  - Provide VTE prophylaxis as needed.  - Monitor bowel function.  - Encourage ambulation and provide assistance as needed.  - Assess and monitor emotional status and provide social service/psych resources as needed.  - Utilize  standard precautions and use personal protective equipment as indicated. Ensure aseptic care of all intravenous lines and invasive tubes/drains.  - Obtain immunization and exposure to communicable diseases history.  Outcome: Progressing  Goal: Optimize infant feeding at the breast  Description: INTERVENTIONS:  - Initiate breast feeding within first hour after birth.   - Monitor effectiveness of current breast feeding efforts.  - Assess support systems available to mother/family.  - Identify cultural beliefs/practices regarding lactation, letdown techniques, maternal food preferences.  - Assess mother's knowledge and previous experience with breast feeding.  - Provide information as needed about early infant feeding cues (e.g., rooting, lip smacking, sucking fingers/hand) versus late cue of crying.  - Discuss/demonstrate breast feeding aids (e.g., infant sling, nursing footstool/pillows, and breast pumps).  - Encourage mother/other family members to express feelings/concerns, and actively listen.  - Educate father/SO about benefits of breast feeding and how to manage common lactation challenges.  - Recommend avoidance of specific medications or substances incompatible with breast feeding.  - Assess and monitor for signs of nipple pain/trauma.  - Instruct and provide assistance with proper latch.  - Review techniques for milk expression (breast pumping) and storage of breast milk. Provide pumping equipment/supplies, instructions and assistance, as needed.  - Encourage rooming-in and breast feeding on demand.  - Encourage skin-to-skin contact.  - Provide LC support as needed.  - Assess for and manage engorgement.  - Provide breast feeding education handouts and information on community breast feeding support.   Outcome: Progressing  Goal: Establishment of adequate milk supply with medication/procedure interruptions  Description: INTERVENTIONS:  - Review techniques for milk expression (breast pumping).   - Provide  pumping equipment/supplies, instructions, and assistance until it is safe to breastfeed infant.  Outcome: Progressing  Goal: Experiences normal breast weaning course  Description: INTERVENTIONS:  - Assess for and manage engorgement.  - Instruct on breast care.  - Provide comfort measures.  Outcome: Progressing  Goal: Appropriate maternal -  bonding  Description: INTERVENTIONS:  - Assess caregiver- interactions.  - Assess caregiver's emotional status and coping mechanisms.  - Encourage caregiver to participate in  daily care.  - Assess support systems available to mother/family.  - Provide /case management support as needed.  Outcome: Progressing

## 2025-02-09 NOTE — PLAN OF CARE
Problem: BIRTH - VAGINAL/ SECTION  Goal: Fetal and maternal status remain reassuring during the birth process  Description: INTERVENTIONS:  - Monitor vital signs  - Monitor fetal heart rate  - Monitor uterine activity  - Monitor labor progression (vaginal delivery)  - DVT prophylaxis (C/S delivery)  - Surgical antibiotic prophylaxis (C/S delivery)  Outcome: Progressing     Problem: PAIN - ADULT  Goal: Verbalizes/displays adequate comfort level or patient's stated pain goal  Description: INTERVENTIONS:  - Encourage pt to monitor pain and request assistance  - Assess pain using appropriate pain scale  - Administer analgesics based on type and severity of pain and evaluate response  - Implement non-pharmacological measures as appropriate and evaluate response  - Consider cultural and social influences on pain and pain management  - Manage/alleviate anxiety  - Utilize distraction and/or relaxation techniques  - Monitor for opioid side effects  - Notify MD/LIP if interventions unsuccessful or patient reports new pain  - Anticipate increased pain with activity and pre-medicate as appropriate  Outcome: Progressing     Problem: ANXIETY  Goal: Will report anxiety at manageable levels  Description: INTERVENTIONS:  - Administer medication as ordered  - Teach and rehearse alternative coping skills  - Provide emotional support with 1:1 interaction with staff  Outcome: Progressing     Problem: Patient Centered Care  Goal: Patient preferences are identified and integrated in the patient's plan of care  Description: Interventions:    - Provide timely, complete, and accurate information to patient/family  - Incorporate patient and family knowledge, values, beliefs, and cultural backgrounds into the planning and delivery of care  - Encourage patient/family to participate in care and decision-making at the level they choose  - Honor patient and family perspectives and choices  Outcome: Progressing     Problem: RESPIRATORY -  ADULT  Goal: Achieves optimal ventilation and oxygenation  Description: INTERVENTIONS:  - Assess for changes in respiratory status  - Assess for changes in mentation and behavior  - Position to facilitate oxygenation and minimize respiratory effort  - Oxygen supplementation based on oxygen saturation or ABGs  - Provide Smoking Cessation handout, if applicable  - Encourage broncho-pulmonary hygiene including cough, deep breathe, Incentive Spirometry  - Assess the need for suctioning and perform as needed  - Assess and instruct to report SOB or any respiratory difficulty  - Respiratory Therapy support as indicated  - Manage/alleviate anxiety  - Monitor for signs/symptoms of CO2 retention  Outcome: Progressing     Problem: GASTROINTESTINAL - ADULT  Goal: Minimal or absence of nausea and vomiting  Description: INTERVENTIONS:  - Maintain adequate hydration with IV or PO as ordered and tolerated  - Nasogastric tube to low intermittent suction as ordered  - Evaluate effectiveness of ordered antiemetic medications  - Provide nonpharmacologic comfort measures as appropriate  - Advance diet as tolerated, if ordered  - Obtain nutritional consult as needed  - Evaluate fluid balance  Outcome: Progressing  Goal: Maintains or returns to baseline bowel function  Description: INTERVENTIONS:  - Assess bowel function  - Maintain adequate hydration with IV or PO as ordered and tolerated  - Evaluate effectiveness of GI medications  - Encourage mobilization and activity  - Obtain nutritional consult as needed  - Establish a toileting routine/schedule  - Consider collaborating with pharmacy to review patient's medication profile  Outcome: Progressing  Goal: Maintains adequate nutritional intake (undernourished)  Description: INTERVENTIONS:  - Monitor percentage of each meal consumed  - Identify factors contributing to decreased intake, treat as appropriate  - Assist with meals as needed  - Monitor I&O, WT and lab values  - Obtain  nutritional consult as needed  - Optimize oral hygiene and moisture  - Encourage food from home; allow for food preferences  - Enhance eating environment  Outcome: Progressing  Goal: Achieves appropriate nutritional intake (bariatric)  Description: INTERVENTIONS:  - Monitor for over-consumption  - Identify factors contributing to increased intake, treat as appropriate  - Monitor I&O, WT and lab values  - Obtain nutritional consult as needed  - Evaluate psychosocial factors contributing to over-consumption  Outcome: Progressing     Problem: GENITOURINARY - ADULT  Goal: Absence of urinary retention  Description: INTERVENTIONS:  - Assess patient’s ability to void and empty bladder  - Monitor intake/output and perform bladder scan as needed  - Follow urinary retention protocol/standard of care  - Consider collaborating with pharmacy to review patient's medication profile  - Implement strategies to promote bladder emptying  Outcome: Progressing     Problem: POSTPARTUM  Goal: Optimize infant feeding at the breast  Description: INTERVENTIONS:  - Initiate breast feeding within first hour after birth.   - Monitor effectiveness of current breast feeding efforts.  - Assess support systems available to mother/family.  - Identify cultural beliefs/practices regarding lactation, letdown techniques, maternal food preferences.  - Assess mother's knowledge and previous experience with breast feeding.  - Provide information as needed about early infant feeding cues (e.g., rooting, lip smacking, sucking fingers/hand) versus late cue of crying.  - Discuss/demonstrate breast feeding aids (e.g., infant sling, nursing footstool/pillows, and breast pumps).  - Encourage mother/other family members to express feelings/concerns, and actively listen.  - Educate father/SO about benefits of breast feeding and how to manage common lactation challenges.  - Recommend avoidance of specific medications or substances incompatible with breast  feeding.  - Assess and monitor for signs of nipple pain/trauma.  - Instruct and provide assistance with proper latch.  - Review techniques for milk expression (breast pumping) and storage of breast milk. Provide pumping equipment/supplies, instructions and assistance, as needed.  - Encourage rooming-in and breast feeding on demand.  - Encourage skin-to-skin contact.  - Provide LC support as needed.  - Assess for and manage engorgement.  - Provide breast feeding education handouts and information on community breast feeding support.   Outcome: Progressing  Goal: Establishment of adequate milk supply with medication/procedure interruptions  Description: INTERVENTIONS:  - Review techniques for milk expression (breast pumping).   - Provide pumping equipment/supplies, instructions, and assistance until it is safe to breastfeed infant.  Outcome: Progressing  Goal: Appropriate maternal -  bonding  Description: INTERVENTIONS:  - Assess caregiver- interactions.  - Assess caregiver's emotional status and coping mechanisms.  - Encourage caregiver to participate in  daily care.  - Assess support systems available to mother/family.  - Provide /case management support as needed.  Outcome: Progressing

## 2025-02-09 NOTE — PROGRESS NOTES
POSTPARTUM PROGRESS NOTE  Date: 2025    Subjective: Patient feeling well this morning. Pain is currently 3/10 with Tylenol and Motrin alone. No excessive vaginal bleeding. She is ambulating and voiding. Passing flatus but no BM yet.     She completed an EPDS overnight and scored 17, including \"sometimes\" for question regarding thoughts of self-harm. She reports this peaked a few days prior to delivery. She got sick with the flu and felt she had no control over her body and was exhausted by the pregnancy. She has struggled with anxiety since the start of pregnancy and has been seeing a therapist. She is open to medication in the future but does not feel she needs it at this time. She is feeling much better since delivery. No SI/HI today. She has chosen not to breastfeed in order to avoid adding another mental stressor.     Vitals  Vitals:    25 1200 25 1615 25 2044 25 0000   BP: 124/77 (!) 121/92 123/88 127/83   BP Location: Left arm Left arm Left arm    Pulse: 73 78 74 78   Resp: 16 16 18    Temp: 98.1 °F (36.7 °C) 98.2 °F (36.8 °C) 97.9 °F (36.6 °C)    TempSrc: Oral Oral Oral    SpO2:  100%     Weight:             PHYSICAL EXAM  General: resting in bed, no acute distress  Chest: no increasing work of breathing  Abdomen: fundus firm, incision c/d/I with Dermabond and Tegaderm  Genitourinary: minimal lochia  Extremities: lower extremities symmetric bilaterally     A/P: Tommy Jim is a 29 year old  POD#2 s/p pLTCS at 37w3d for arrest of descent during IOL for preE without severe features.     #Routine postpartum care  -Pain: Tylenol and Motrin scheduled, gabapentin PRN for breakthrough pain  -Hemodynamics:   ---acute blood loss with anemia  ---11.7 > EBL 870mL > 7.7  ---iron supplementation: venofer x1 dose today followed by daily oral iron  -Infant: baby girl doing well  -Infant feeding: formula  --patient not interested in breastfeeding. Recommend breast binding and ice and  avoidance of nipple stimulation. Will monitor for engorgement and mastitis    #Pre-eclampsia without severe features  -mild range BP intrapartum, normotensive this AM  -asymptomatic with normal HELLP labs  -will continue to monitor for severe features    #High risk for postpartum depression  -EPDS 17   -has a therapist, no acute concerns today  -social work consult ordered while inpatient  -discussed possibility of starting an SSRI if symptoms persist, patient agrees    Disposition: anticipate discharge home on POD#3-4    Era Sanchez, DO

## 2025-02-10 LAB — MAGNESIUM SERPL-MCNC: 1.5 MG/DL (ref 4.8–8.4)

## 2025-02-10 RX ORDER — NIFEDIPINE 10 MG/1
10 CAPSULE ORAL ONCE
Status: DISCONTINUED | OUTPATIENT
Start: 2025-02-10 | End: 2025-02-10

## 2025-02-10 RX ORDER — DOCUSATE SODIUM 100 MG/1
100 CAPSULE, LIQUID FILLED ORAL 2 TIMES DAILY PRN
Qty: 60 CAPSULE | Refills: 0 | Status: SHIPPED | OUTPATIENT
Start: 2025-02-10 | End: 2025-03-12

## 2025-02-10 RX ORDER — IBUPROFEN 600 MG/1
600 TABLET, FILM COATED ORAL EVERY 6 HOURS PRN
Qty: 60 TABLET | Refills: 0 | Status: SHIPPED | OUTPATIENT
Start: 2025-02-10

## 2025-02-10 RX ORDER — POLYETHYLENE GLYCOL 3350 17 G/17G
17 POWDER, FOR SOLUTION ORAL 2 TIMES DAILY PRN
Qty: 60 EACH | Refills: 0 | Status: SHIPPED | OUTPATIENT
Start: 2025-02-10 | End: 2025-03-12

## 2025-02-10 RX ORDER — SERTRALINE HYDROCHLORIDE 25 MG/1
25 TABLET, FILM COATED ORAL DAILY
Qty: 90 TABLET | Refills: 0 | Status: SHIPPED | OUTPATIENT
Start: 2025-02-11

## 2025-02-10 RX ORDER — NIFEDIPINE 10 MG/1
10 CAPSULE ORAL ONCE AS NEEDED
Status: COMPLETED | OUTPATIENT
Start: 2025-02-10 | End: 2025-02-10

## 2025-02-10 RX ORDER — GABAPENTIN 300 MG/1
300 CAPSULE ORAL 3 TIMES DAILY
Qty: 21 CAPSULE | Refills: 0 | Status: SHIPPED | OUTPATIENT
Start: 2025-02-10 | End: 2025-02-17

## 2025-02-10 RX ORDER — NIFEDIPINE 30 MG/1
30 TABLET, EXTENDED RELEASE ORAL DAILY
Status: DISCONTINUED | OUTPATIENT
Start: 2025-02-10 | End: 2025-02-11

## 2025-02-10 RX ORDER — ACETAMINOPHEN 325 MG/1
325 TABLET ORAL EVERY 6 HOURS PRN
Qty: 60 TABLET | Refills: 0 | Status: SHIPPED | OUTPATIENT
Start: 2025-02-10

## 2025-02-10 RX ORDER — CALCIUM GLUCONATE 94 MG/ML
1 INJECTION, SOLUTION INTRAVENOUS ONCE AS NEEDED
Status: ACTIVE | OUTPATIENT
Start: 2025-02-10 | End: 2025-02-10

## 2025-02-10 NOTE — DISCHARGE SUMMARY
[facility]  part of [facility]    Discharge Summary    Tommy Jim Patient Status:  Inpatient    10/14/1995 MRN [id number]   Location [facility] 3SE Attending Joseluis Rodriguez MD   Hosp Day # 4 PCP No primary care provider on file.     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, [id number] Alpha Thalassemia, HBA1/HBA2-related; partner is negative    Elevated blood pressure affecting pregnancy in third trimester, antepartum ([facility])    Pregnancy ([facility]ly [facility])    Arrest of descent, delivered, current hospitalization ([facility])     delivery delivered ([facility])        Primary OB Clinician: [facility]    [facility] Course:     EDC: Estimated Date of Delivery: 25    Gestational Age: 37w3d    Date of Delivery: 2025    Antepartum complications: none    Delivered By: Dr. Ольга Nicholas    Delivery Type: Primary  section 2/ arrest of descent.     Tubal Ligation: n/a    Baby: Liveborn female,     Apgars:  1 minute:   9                 5 minutes: 9                            Anesthesia: epidural      Surgical Procedures       Case IDs Date Procedure Surgeon Location Status    25  SECTION Ольга Nicholas MD [facility] L+D OR Comp            Intrapartum Complications: Arrest of descent.     Laceration: n/a    Episiotomy: none    Placenta: manual removal    Feeding Method: bottle fed     Rh Immune Globulin Given: no    Rubella Vaccine Given: no        Discharge Plan:   Discharge Condition: Good  Early Discharge:  NO    Discharge medications:  Current Discharge Medication List        New Orders    Details   ibuprofen 600 MG Oral Tab Take 1 tablet (600 mg total) by mouth every 6 (six) hours as needed  for Pain.      acetaminophen 325 MG Oral Tab Take 1 tablet (325 mg total) by mouth every 6 (six) hours as needed for Pain.      docusate sodium 100 MG Oral Cap Take 1 capsule (100 mg total) by mouth 2 (two) times daily as needed.      polyethylene glycol, PEG 3350, 17 g Oral Powd Pack Take 17 g by mouth 2 (two) times daily as needed.      gabapentin 300 MG Oral Cap Take 1 capsule (300 mg total) by mouth in the morning, at noon, and at bedtime for 7 days.           Home Meds - Unchanged    Details   prenatal vitamin with DHA 27-0.8-228 MG Oral Cap Take 1 capsule by mouth daily.      hydrocortisone 2.5 % External Cream Place 1 Application rectally daily.      famotidine (PEPCID AC MAXIMUM STRENGTH) 20 MG Oral Tab Take 1 tablet (20 mg total) by mouth 2 (two) times daily as needed for Heartburn.      hydrocortisone 2.5 % External Cream Place rectally.      hydrocortisone 25 MG Rectal Suppos Place 1 suppository (25 mg total) rectally 3 (three) times daily as needed.      Wheat Dextrin (BENEFIBER) Oral Powder Take 3 g by mouth daily.                   Discharge Diet: As tolerated and General diet    Discharge Activity: Pelvic rest until cleared    Follow up:      Follow-up Information       Ольга Nicholas MD. Schedule an appointment as soon as possible for a visit in 2 week(s).    Specialty: OBSTETRICS & GYNECOLOGY  Why: postop visit  Contact information:  [address] [id number] [phone]               Ольга Nicholas MD Follow up in 6 week(s).    Specialty: OBSTETRICS & GYNECOLOGY  Why: Post Partum appointment  Contact information:  [address] [address]  [phone]                             Follow up Labs: None      Other Discharge Instructions:           Post  Section Home Care Instructions     We hope you were pleased with your care at [facility].  We wish you the best outcome and overall experience with the delivery of your baby.   These instructions will help to minimize pain, limit the risk for an infection, and improve the likelihood of a successful recovery.    What to Expect:  Abdominal cramping after delivery especially if you are breastfeeding.   Vaginal bleeding for about 4-6 weeks that may be followed by a yellow or white discharge for a few more weeks.  Your period will resume in approximately 6-8 weeks, unless you are breastfeeding.    If you are bottle feeding, you may notice breast engorgement in about 3 days.  Your breast may be sore and hard. Please wear a tight fitted bra or sports bra for 24-36 hours to help prevent your breast from producing milk, and use ice packs to relive any discomfort.  If you are breastfeeding, nipple dryness is very common the first few days.    Constipation is common after having a baby.  Please increase fluid and fiber in your diet.      Prescribed medication:  - prescriptions for gabapentin and ibuprofen were sent to your preferred pharmacy.   - take the gabapentin every 8 hours for the next 3-4 days, then you can back off and take only as needed for the rest of the week.  - take the ibuprofen every 8 hours for the next 3-4 days (alternate with the gabapentin), then you can back off and take only as needed for pain while you continue healing.  - nifedipine 30 mg was prescribed to lower your blood pressure. Take this medicine every day in the morning.    Check your blood pressure daily - around the same time each day.  - if you have a headache that doesn't go away, or feel \"off\" check your blood pressure.  - if your BP is higher than 160/110, call our office [phone].    Over-The-Counter Medication  Non-prescription anti-inflammatory medications can also help to ease the pain.  You may take Tylenol  Colace or Metamucil for Constipation  Lanolin for dry nipples  Tucks, Witch Hazel and Epifoam for vaginal/perineum discomfort.   Drink a full glass of water with oral medication and take as  directed.    Wound Care  The following instructions will promote proper healing and help to prevent infection  Please use soap and water over incision   Pat your incision dry and leave open to air if possible   There are stitches below your skin and a clear sticker over your skin. You can remove the clear sticker in 1 week if you would like. Otherwise we will removed it in 2 weeks at your incision check appointment.    Bathing/Showers  You may resume showers  No baths, swimming, hot tubs until your post-partum visit    Home Medication  Resume your home medications as instructed    Diet  Resume your normal diet    Activity  Refrain from vaginal intercourse, vaginal suppositories, tampon use or douches until after your post-partum visit  No exercising for 4 weeks  You may climb stairs minimally for the 1st week.    Do not do heavy housework for at least 2-3 weeks    Return to Work or School  You may return to work in 6-8 weeks  Contact your obstetrician’s office, if you need a medical release. ([phone])    Driving  Avoid driving for 1-2 weeks or sooner if not taking narcotics.    Follow-up Appointment with Your Obstetrician  Call your obstetrician’s office today for an appointment in four weeks.    The number is [phone].  Verify your appointment date, day, time, and location.  At your 1st post-partum office visit:  Your progress will be evaluated, findings reviewed, and any additional concerns and instructions will be discussed.    Questions or Concerns  Call your obstetrician’s office if you experience the following:  Severe pain not controlled by pain medication  Foul smelling vaginal discharge  Heavy bleeding  Shortness of breath  Fever  Redness, increased swelling or drainage from your incision  Crying and periods of sadness that prevents you from caring for yourself and your baby  Burning sensation during urination or inability to urinate  Swelling, redness or abnormal warmth to your leg/calf  Please  call [phone]. If your call is made after office hours, a physician will be available to help you.  There is always a provider covering our patients.    Thank you for coming to [facility] to start your new family.  The nurses, obstetricians, and the anesthesiologists try very hard to make sure you receive the best care possible.  Your trust in them as well as us is greatly appreciated.    Thanks so much,   The [facility] Obstetrics and Gynecology   Psychiatry Referrals  [facility] (counseling and medication management)  Locations: [facility]   [phone]   If you are experiencing a mental health crisis, please call 911 or go to your nearest ED. If you are not in immediate danger but require emotional support and/or assistance, please contact: [facility] at [phone] or [facility] at [phone].                Dr. Joseluis Rodriguez MD    [facility] 10 OBGYN     This note was created by [employer] voice recognition. Errors in content may be related to improper recognition by the system; efforts to review and correct have been done but errors may still exist. Please be advised the primary purpose of this note is for me to communicate medical care. Standard sentence structure is not always used. Medical terminology and medical abbreviations may be used. There may be grammatical, typographical, and automated fill ins that may have errors missed in proofreading.

## 2025-02-10 NOTE — PLAN OF CARE
Problem: BIRTH - VAGINAL/ SECTION  Goal: Fetal and maternal status remain reassuring during the birth process  Description: INTERVENTIONS:  - Monitor vital signs  - Monitor fetal heart rate  - Monitor uterine activity  - Monitor labor progression (vaginal delivery)  - DVT prophylaxis (C/S delivery)  - Surgical antibiotic prophylaxis (C/S delivery)  Outcome: Progressing     Problem: PAIN - ADULT  Goal: Verbalizes/displays adequate comfort level or patient's stated pain goal  Description: INTERVENTIONS:  - Encourage pt to monitor pain and request assistance  - Assess pain using appropriate pain scale  - Administer analgesics based on type and severity of pain and evaluate response  - Implement non-pharmacological measures as appropriate and evaluate response  - Consider cultural and social influences on pain and pain management  - Manage/alleviate anxiety  - Utilize distraction and/or relaxation techniques  - Monitor for opioid side effects  - Notify MD/LIP if interventions unsuccessful or patient reports new pain  - Anticipate increased pain with activity and pre-medicate as appropriate  Outcome: Progressing     Problem: ANXIETY  Goal: Will report anxiety at manageable levels  Description: INTERVENTIONS:  - Administer medication as ordered  - Teach and rehearse alternative coping skills  - Provide emotional support with 1:1 interaction with staff  Outcome: Progressing     Problem: Patient Centered Care  Goal: Patient preferences are identified and integrated in the patient's plan of care  Description: Interventions:  - What would you like us to know as we care for you?   - Provide timely, complete, and accurate information to patient/family  - Incorporate patient and family knowledge, values, beliefs, and cultural backgrounds into the planning and delivery of care  - Encourage patient/family to participate in care and decision-making at the level they choose  - Honor patient and family perspectives and  choices  Outcome: Progressing     Problem: Patient/Family Goals  Goal: Patient/Family Long Term Goal  Description: Patient's Long Term Goal:     Interventions:  -   - See additional Care Plan goals for specific interventions  Outcome: Progressing  Goal: Patient/Family Short Term Goal  Description: Patient's Short Term Goal:     Interventions:   -   - See additional Care Plan goals for specific interventions  Outcome: Progressing     Problem: RESPIRATORY - ADULT  Goal: Achieves optimal ventilation and oxygenation  Description: INTERVENTIONS:  - Assess for changes in respiratory status  - Assess for changes in mentation and behavior  - Position to facilitate oxygenation and minimize respiratory effort  - Oxygen supplementation based on oxygen saturation or ABGs  - Provide Smoking Cessation handout, if applicable  - Encourage broncho-pulmonary hygiene including cough, deep breathe, Incentive Spirometry  - Assess the need for suctioning and perform as needed  - Assess and instruct to report SOB or any respiratory difficulty  - Respiratory Therapy support as indicated  - Manage/alleviate anxiety  - Monitor for signs/symptoms of CO2 retention  Outcome: Progressing     Problem: GASTROINTESTINAL - ADULT  Goal: Minimal or absence of nausea and vomiting  Description: INTERVENTIONS:  - Maintain adequate hydration with IV or PO as ordered and tolerated  - Nasogastric tube to low intermittent suction as ordered  - Evaluate effectiveness of ordered antiemetic medications  - Provide nonpharmacologic comfort measures as appropriate  - Advance diet as tolerated, if ordered  - Obtain nutritional consult as needed  - Evaluate fluid balance  Outcome: Completed  Goal: Maintains or returns to baseline bowel function  Description: INTERVENTIONS:  - Assess bowel function  - Maintain adequate hydration with IV or PO as ordered and tolerated  - Evaluate effectiveness of GI medications  - Encourage mobilization and activity  - Obtain  nutritional consult as needed  - Establish a toileting routine/schedule  - Consider collaborating with pharmacy to review patient's medication profile  Outcome: Progressing  Goal: Maintains adequate nutritional intake (undernourished)  Description: INTERVENTIONS:  - Monitor percentage of each meal consumed  - Identify factors contributing to decreased intake, treat as appropriate  - Assist with meals as needed  - Monitor I&O, WT and lab values  - Obtain nutritional consult as needed  - Optimize oral hygiene and moisture  - Encourage food from home; allow for food preferences  - Enhance eating environment  Outcome: Completed  Goal: Achieves appropriate nutritional intake (bariatric)  Description: INTERVENTIONS:  - Monitor for over-consumption  - Identify factors contributing to increased intake, treat as appropriate  - Monitor I&O, WT and lab values  - Obtain nutritional consult as needed  - Evaluate psychosocial factors contributing to over-consumption  Outcome: Completed     Problem: GENITOURINARY - ADULT  Goal: Absence of urinary retention  Description: INTERVENTIONS:  - Assess patient’s ability to void and empty bladder  - Monitor intake/output and perform bladder scan as needed  - Follow urinary retention protocol/standard of care  - Consider collaborating with pharmacy to review patient's medication profile  - Implement strategies to promote bladder emptying  Outcome: Completed     Problem: POSTPARTUM  Goal: Long Term Goal:Experiences normal postpartum course  Description: INTERVENTIONS:  - Assess and monitor vital signs and lab values.  - Assess fundus and lochia.  - Provide ice/sitz baths for perineum discomfort.  - Monitor healing of incision/episiotomy/laceration, and assess for signs and symptoms of infection and hematoma.  - Assess bladder function and monitor for bladder distention.  - Provide/instruct/assist with pericare as needed.  - Provide VTE prophylaxis as needed.  - Monitor bowel function.  -  Encourage ambulation and provide assistance as needed.  - Assess and monitor emotional status and provide social service/psych resources as needed.  - Utilize standard precautions and use personal protective equipment as indicated. Ensure aseptic care of all intravenous lines and invasive tubes/drains.  - Obtain immunization and exposure to communicable diseases history.  Outcome: Progressing  Goal: Experiences normal breast weaning course  Description: INTERVENTIONS:  - Assess for and manage engorgement.  - Instruct on breast care.  - Provide comfort measures.  Outcome: Progressing  Goal: Appropriate maternal -  bonding  Description: INTERVENTIONS:  - Assess caregiver- interactions.  - Assess caregiver's emotional status and coping mechanisms.  - Encourage caregiver to participate in  daily care.  - Assess support systems available to mother/family.  - Provide /case management support as needed.  Outcome: Progressing

## 2025-02-10 NOTE — PROGRESS NOTES
CHAITANYA PPD SCREENING    Reason for Referral: EPDS = 17; Question 10 = 2    Current Stressors:    History of PPD: No hx of PPD.    Financial: None reported.    Other: Pt reports she has struggled w/ low mood in the past however it has never been as severe as it was at the beginning and towards the end of her pregnancy.     Mental Health Status:    Current Symptoms: Tearful during encounter but engaged in discussion.    Appearance/General Behavior: No abnormalities in appearance/behavior. Pt resting in bed, good eye contact.   General Cognitive Functioning: No impairment.    Thought Process: Linear and logical    Thought Content: No impairment. Pt denied experiencing any hallucinations.    Mood/Affect: Anxious; congruent to mood   Orientation: Pt alert and oriented x3.    Speech: Normal rate, rhythm, and volume    Homicidal/Suicidal Ideation/Plan: Pt denies any current SI or HI.     Living Arrangement:    Who Resides at Home: FOB.       Has other Children: No    Is Father of the Baby involved: Yes    Has Familial Support: Yes    Has Other Support Network: Pt reports feeling supported by  and her mom. Pt has been working with established therapist Elsa every Thursday.     Plan:    Provide PPD Information:     Postpartum Depression Resources Given: Yes    C[employer]le [employer] Information Given: Yes    Depression During and After Pregnancy Information given: Yes   Provide [facility] Contact Information: Yes   Other Information Given: Referrals for Psychiatry provided in pt's AVS.       [facility] to f/u on EPDS. FOB stepped out of room for the duration of encounter. Pt alert and oriented x3.     Pt reports a hx of depression and anxiety. Pt endorsed depressive symptoms including feelings of guilt, hopelessness, helplessness, negative thoughts, feeling a lack of control over her body. Pt experienced suicidal thoughts at the beginning of pregnancy (2-3x per week)  and again in the last week leading up to delivery.  Thoughts included 'It'd be better if I were not here'. Pt attributes thoughts of SI to physical and emotional exhaustion, pregnancy related changes., and working up until the end of her pregnancy. Pt denies intent or plan to act on these thoughts. No current SI or safety concerns noted. No hx of attempts.     Per chart review pt seen at [facility] in August 2024 and has been working with therapist Elsa weekly since. Pt plans to continue weekly sessions upon discharge.     Pt to start Zoloft this AM. No prior hx of medications. Has been considering short term disability, encouraged pt to follow up with PCP or OB for further eval and documentation needs. [facility] to provide psychiatry referrals in pt's AVS.     RN notified of completed consult.    MISA Appiah  /8

## 2025-02-10 NOTE — PROGRESS NOTES
[facility]  Obstetrics and Gynecology    OB/GYN: Postpartum Progress Note     SUBJECTIVE:  Patient is a 29 year old  female who is s/p PLTCS. She is POD# 3.   Doing well. Noted some bilateral equal LE swelling. Desires discharge home today. Denies fever, chills, N, V, chest pain and SOB. Bleeding has been stable. Voiding without difficulty.  Passing flatus.  Had a Bm this morning. Tolerating general diet. Ambulating without difficulty. Reviewed techniques to manage breast engorgement if and when breast mild comes in.     OBJECTIVE:  Vitals:    25 2023 02/10/25 0000 02/10/25 0400 02/10/25 0740   BP: (!) 144/99 137/90 (!) 153/98 (!) 134/93   Pulse: 75 74 61 67   Resp: 16   14   Temp: 98.2 °F (36.8 °C)   98.4 °F (36.9 °C)   TempSrc: Oral   Oral   SpO2:       Weight:           Physical Exam:  Lungs:   Respirations non labored   Cardiovascular:   Peripheral pulses +2 bilaterally      General:    AAA. NAD.    Abdomen Soft, nontender, nondistended   Lochia:  appropriate   Uterine Fundus:   firm at the umbilicus   Incision:  healing well, no significant drainage, no dehiscence, no significant erythema with Tegaderm in place    DVT Evaluation:  No evidence of DVT seen on physical exam.  Negative Gilbert's sign.  No cords or calf tenderness.  No significant calf/ankle edema.     Urinary output is adequate. (When recorded)    Labs:  Recent Labs   Lab 25  0533   RBC 2.91*   HGB 7.7*   HCT 24.7*   MCV 84.9   MCH 26.5   MCHC 31.2   RDW 14.8   NEPRELIM 8.81*   WBC 11.2*   .0       ASSESSMENT/PLAN:  Patient is a 29 year old  female who is s/p PLTCS POD# 3.     Doing well   Continue routine postpartum care  Vitals per routine   Encourage ambulation and IS use   Plan for discharge to home today.   Follow up in 2&6 weeks        Dr. Joseluis Rodriguez MD    [facility]     This note was created by [employer] voice recognition. Errors in content may be related to improper recognition by the  system; efforts to review and correct have been done but errors may still exist. Please be advised the primary purpose of this note is for me to communicate medical care. Standard sentence structure is not always used. Medical terminology and medical abbreviations may be used. There may be grammatical, typographical, and automated fill ins that may have errors missed in proofreading.

## 2025-02-11 ENCOUNTER — TELEPHONE (OUTPATIENT)
Dept: OBGYN CLINIC | Facility: CLINIC | Age: 30
End: 2025-02-11

## 2025-02-11 VITALS
SYSTOLIC BLOOD PRESSURE: 131 MMHG | OXYGEN SATURATION: 98 % | RESPIRATION RATE: 15 BRPM | DIASTOLIC BLOOD PRESSURE: 87 MMHG | TEMPERATURE: 98 F | WEIGHT: 190 LBS | HEART RATE: 98 BPM | BODY MASS INDEX: 30 KG/M2

## 2025-02-11 PROBLEM — O14.10 SEVERE PRE-ECLAMPSIA, ANTEPARTUM (HCC): Status: ACTIVE | Noted: 2025-02-11

## 2025-02-11 LAB
MAGNESIUM SERPL-MCNC: 4.5 MG/DL (ref 4.8–8.4)
MAGNESIUM SERPL-MCNC: 4.6 MG/DL (ref 4.8–8.4)
MAGNESIUM SERPL-MCNC: 5 MG/DL (ref 4.8–8.4)
MAGNESIUM SERPL-MCNC: 5.7 MG/DL (ref 4.8–8.4)

## 2025-02-11 RX ORDER — NIFEDIPINE 30 MG
30 TABLET, EXTENDED RELEASE ORAL DAILY
Qty: 30 TABLET | Refills: 3 | Status: SHIPPED | OUTPATIENT
Start: 2025-02-11

## 2025-02-11 NOTE — PLAN OF CARE
Problem: BIRTH - VAGINAL/ SECTION  Goal: Fetal and maternal status remain reassuring during the birth process  Description: INTERVENTIONS:  - Monitor vital signs  - Monitor fetal heart rate  - Monitor uterine activity  - Monitor labor progression (vaginal delivery)  - DVT prophylaxis (C/S delivery)  - Surgical antibiotic prophylaxis (C/S delivery)  Outcome: Completed     Problem: PAIN - ADULT  Goal: Verbalizes/displays adequate comfort level or patient's stated pain goal  Description: INTERVENTIONS:  - Encourage pt to monitor pain and request assistance  - Assess pain using appropriate pain scale  - Administer analgesics based on type and severity of pain and evaluate response  - Implement non-pharmacological measures as appropriate and evaluate response  - Consider cultural and social influences on pain and pain management  - Manage/alleviate anxiety  - Utilize distraction and/or relaxation techniques  - Monitor for opioid side effects  - Notify MD/LIP if interventions unsuccessful or patient reports new pain  - Anticipate increased pain with activity and pre-medicate as appropriate  Outcome: Completed     Problem: ANXIETY  Goal: Will report anxiety at manageable levels  Description: INTERVENTIONS:  - Administer medication as ordered  - Teach and rehearse alternative coping skills  - Provide emotional support with 1:1 interaction with staff  Outcome: Completed     Problem: Patient Centered Care  Goal: Patient preferences are identified and integrated in the patient's plan of care  Description: Interventions:  - What would you like us to know as we care for you?   - Provide timely, complete, and accurate information to patient/family  - Incorporate patient and family knowledge, values, beliefs, and cultural backgrounds into the planning and delivery of care  - Encourage patient/family to participate in care and decision-making at the level they choose  - Honor patient and family perspectives and  choices  Outcome: Progressing     Problem: RESPIRATORY - ADULT  Goal: Achieves optimal ventilation and oxygenation  Description: INTERVENTIONS:  - Assess for changes in respiratory status  - Assess for changes in mentation and behavior  - Position to facilitate oxygenation and minimize respiratory effort  - Oxygen supplementation based on oxygen saturation or ABGs  - Provide Smoking Cessation handout, if applicable  - Encourage broncho-pulmonary hygiene including cough, deep breathe, Incentive Spirometry  - Assess the need for suctioning and perform as needed  - Assess and instruct to report SOB or any respiratory difficulty  - Respiratory Therapy support as indicated  - Manage/alleviate anxiety  - Monitor for signs/symptoms of CO2 retention  Outcome: Progressing     Problem: POSTPARTUM  Goal: Long Term Goal:Experiences normal postpartum course  Description: INTERVENTIONS:  - Assess and monitor vital signs and lab values.  - Assess fundus and lochia.  - Provide ice/sitz baths for perineum discomfort.  - Monitor healing of incision/episiotomy/laceration, and assess for signs and symptoms of infection and hematoma.  - Assess bladder function and monitor for bladder distention.  - Provide/instruct/assist with pericare as needed.  - Provide VTE prophylaxis as needed.  - Monitor bowel function.  - Encourage ambulation and provide assistance as needed.  - Assess and monitor emotional status and provide social service/psych resources as needed.  - Utilize standard precautions and use personal protective equipment as indicated. Ensure aseptic care of all intravenous lines and invasive tubes/drains.  - Obtain immunization and exposure to communicable diseases history.  Outcome: Progressing  Goal: Optimize infant feeding at the breast  Description: INTERVENTIONS:  - Initiate breast feeding within first hour after birth.   - Monitor effectiveness of current breast feeding efforts.  - Assess support systems available to  mother/family.  - Identify cultural beliefs/practices regarding lactation, letdown techniques, maternal food preferences.  - Assess mother's knowledge and previous experience with breast feeding.  - Provide information as needed about early infant feeding cues (e.g., rooting, lip smacking, sucking fingers/hand) versus late cue of crying.  - Discuss/demonstrate breast feeding aids (e.g., infant sling, nursing footstool/pillows, and breast pumps).  - Encourage mother/other family members to express feelings/concerns, and actively listen.  - Educate father/SO about benefits of breast feeding and how to manage common lactation challenges.  - Recommend avoidance of specific medications or substances incompatible with breast feeding.  - Assess and monitor for signs of nipple pain/trauma.  - Instruct and provide assistance with proper latch.  - Review techniques for milk expression (breast pumping) and storage of breast milk. Provide pumping equipment/supplies, instructions and assistance, as needed.  - Encourage rooming-in and breast feeding on demand.  - Encourage skin-to-skin contact.  - Provide LC support as needed.  - Assess for and manage engorgement.  - Provide breast feeding education handouts and information on community breast feeding support.   Outcome: Progressing  Goal: Establishment of adequate milk supply with medication/procedure interruptions  Description: INTERVENTIONS:  - Review techniques for milk expression (breast pumping).   - Provide pumping equipment/supplies, instructions, and assistance until it is safe to breastfeed infant.  Outcome: Progressing  Goal: Experiences normal breast weaning course  Description: INTERVENTIONS:  - Assess for and manage engorgement.  - Instruct on breast care.  - Provide comfort measures.  Outcome: Progressing  Goal: Appropriate maternal -  bonding  Description: INTERVENTIONS:  - Assess caregiver- interactions.  - Assess caregiver's emotional status and  coping mechanisms.  - Encourage caregiver to participate in  daily care.  - Assess support systems available to mother/family.  - Provide /case management support as needed.  Outcome: Progressing

## 2025-02-11 NOTE — PROGRESS NOTES
Discharge order received from MD. Blood pressures checked until 1900 as ordered and remain WNL.    Discharge instructions and medications reviewed with patient. ID band matched with baby band. Follow up instructions with OB given. Mother verbalizes understanding of instructions and is in stable condition. Patient can be discharged when ride is available.

## 2025-02-11 NOTE — PLAN OF CARE
Problem: Patient Centered Care  Goal: Patient preferences are identified and integrated in the patient's plan of care  Description: Interventions:  - What would you like us to know as we care for you?   - Provide timely, complete, and accurate information to patient/family  - Incorporate patient and family knowledge, values, beliefs, and cultural backgrounds into the planning and delivery of care  - Encourage patient/family to participate in care and decision-making at the level they choose  - Honor patient and family perspectives and choices  Outcome: Progressing     Problem: Patient/Family Goals  Goal: Patient/Family Long Term Goal  Description: Patient's Long Term Goal:     Interventions:  -   - See additional Care Plan goals for specific interventions  Outcome: Progressing  Goal: Patient/Family Short Term Goal  Description: Patient's Short Term Goal:     Interventions:   -   - See additional Care Plan goals for specific interventions  Outcome: Progressing     Problem: RESPIRATORY - ADULT  Goal: Achieves optimal ventilation and oxygenation  Description: INTERVENTIONS:  - Assess for changes in respiratory status  - Assess for changes in mentation and behavior  - Position to facilitate oxygenation and minimize respiratory effort  - Oxygen supplementation based on oxygen saturation or ABGs  - Provide Smoking Cessation handout, if applicable  - Encourage broncho-pulmonary hygiene including cough, deep breathe, Incentive Spirometry  - Assess the need for suctioning and perform as needed  - Assess and instruct to report SOB or any respiratory difficulty  - Respiratory Therapy support as indicated  - Manage/alleviate anxiety  - Monitor for signs/symptoms of CO2 retention  Outcome: Progressing     Problem: GASTROINTESTINAL - ADULT  Goal: Maintains or returns to baseline bowel function  Description: INTERVENTIONS:  - Assess bowel function  - Maintain adequate hydration with IV or PO as ordered and tolerated  - Evaluate  effectiveness of GI medications  - Encourage mobilization and activity  - Obtain nutritional consult as needed  - Establish a toileting routine/schedule  - Consider collaborating with pharmacy to review patient's medication profile  Outcome: Progressing     Problem: POSTPARTUM  Goal: Long Term Goal:Experiences normal postpartum course  Description: INTERVENTIONS:  - Assess and monitor vital signs and lab values.  - Assess fundus and lochia.  - Provide ice/sitz baths for perineum discomfort.  - Monitor healing of incision/episiotomy/laceration, and assess for signs and symptoms of infection and hematoma.  - Assess bladder function and monitor for bladder distention.  - Provide/instruct/assist with pericare as needed.  - Provide VTE prophylaxis as needed.  - Monitor bowel function.  - Encourage ambulation and provide assistance as needed.  - Assess and monitor emotional status and provide social service/psych resources as needed.  - Utilize standard precautions and use personal protective equipment as indicated. Ensure aseptic care of all intravenous lines and invasive tubes/drains.  - Obtain immunization and exposure to communicable diseases history.  Outcome: Progressing  Goal: Optimize infant feeding at the breast  Description: INTERVENTIONS:  - Initiate breast feeding within first hour after birth.   - Monitor effectiveness of current breast feeding efforts.  - Assess support systems available to mother/family.  - Identify cultural beliefs/practices regarding lactation, letdown techniques, maternal food preferences.  - Assess mother's knowledge and previous experience with breast feeding.  - Provide information as needed about early infant feeding cues (e.g., rooting, lip smacking, sucking fingers/hand) versus late cue of crying.  - Discuss/demonstrate breast feeding aids (e.g., infant sling, nursing footstool/pillows, and breast pumps).  - Encourage mother/other family members to express feelings/concerns, and  actively listen.  - Educate father/SO about benefits of breast feeding and how to manage common lactation challenges.  - Recommend avoidance of specific medications or substances incompatible with breast feeding.  - Assess and monitor for signs of nipple pain/trauma.  - Instruct and provide assistance with proper latch.  - Review techniques for milk expression (breast pumping) and storage of breast milk. Provide pumping equipment/supplies, instructions and assistance, as needed.  - Encourage rooming-in and breast feeding on demand.  - Encourage skin-to-skin contact.  - Provide LC support as needed.  - Assess for and manage engorgement.  - Provide breast feeding education handouts and information on community breast feeding support.   Outcome: Progressing  Goal: Establishment of adequate milk supply with medication/procedure interruptions  Description: INTERVENTIONS:  - Review techniques for milk expression (breast pumping).   - Provide pumping equipment/supplies, instructions, and assistance until it is safe to breastfeed infant.  Outcome: Progressing  Goal: Experiences normal breast weaning course  Description: INTERVENTIONS:  - Assess for and manage engorgement.  - Instruct on breast care.  - Provide comfort measures.  Outcome: Progressing  Goal: Appropriate maternal -  bonding  Description: INTERVENTIONS:  - Assess caregiver- interactions.  - Assess caregiver's emotional status and coping mechanisms.  - Encourage caregiver to participate in  daily care.  - Assess support systems available to mother/family.  - Provide /case management support as needed.  Outcome: Progressing

## 2025-02-11 NOTE — TELEPHONE ENCOUNTER
----- Message from Rhonda Felix sent at 2/11/2025  2:08 PM CST -----  Regarding: BP check appt  Hi,  Please help Tommy schedule a BP check appt for Friday 2/14 (she had severe pre-eclampsia postpartum and is DC home on 2/11.)    Thank you!  ~RD

## 2025-02-11 NOTE — PROGRESS NOTES
[facility]  Obstetrics and Gynecology    OB/GYN: Postpartum Progress Note     SUBJECTIVE:  Patient is a 29 year old  female who is s/p PLTCS. She is POD# 4.  Was ready for DC home yesterday, but had sustained severe range BP, so was started on mangnesium sulfate for seizure prophylaxis x 24 hour and started on PO nifedipine.    Doing well. Noted some bilateral equal LE swelling. Denies fever, chills, N, V, chest pain and SOB. Bleeding has been stable. Has taylor in while on magnesium.  Passing flatus.  + Bm. Tolerating general diet. Ambulating without difficulty.     OBJECTIVE:  Vitals:    25 1000 25 1100 25 1200 25 1300   BP: (!) 137/97 (!) 142/98 (!) 136/92 133/88   Pulse: 101 93 89 80   Resp:       Temp:       TempSrc:       SpO2: 98% 99% 99% 98%   Weight:           Physical Exam:  Lungs:   Respirations non labored   Cardiovascular:   Peripheral pulses +2 bilaterally      General:    AAA. NAD.    Abdomen Soft, nontender, nondistended   Lochia:  appropriate   Uterine Fundus:   firm at the umbilicus   Incision:  healing well, no significant drainage, no dehiscence, no significant erythema with Tegaderm in place    DVT Evaluation:  No evidence of DVT seen on physical exam.  Negative Gilbert's sign.  No cords or calf tenderness.  No significant calf/ankle edema.     Urinary output is adequate.    Labs:  Recent Labs   Lab 25  0533   RBC 2.91*   HGB 7.7*   HCT 24.7*   MCV 84.9   MCH 26.5   MCHC 31.2   RDW 14.8   NEPRELIM 8.81*   WBC 11.2*   .0       ASSESSMENT/PLAN:  Patient is a 29 year old  female who is s/p PLTCS POD# 4.     Doing well   Continue routine postpartum care  Vitals per routine   Encourage ambulation and IS use     Severe pre-eclampsia  - 24 hrs of magnesium due to be finished @ 1700 today.  - no severe range BP since yesterday  - cont nifedipine 30 mg daily upon discharge  - monitor for at least 2 hours (and 2 spontaneous voids after  mag turns off) as long as no sever range Bps, stable and cleared for discharge home tonight.    Discharge instructions reviewed.  F/u for BP check in office in 3 days.   Follow up in 2&6 weeks        DO ELIZABETH Brice [facility]

## 2025-02-12 NOTE — PAYOR COMM NOTE
--------------  DISCHARGE REVIEW    Payor: [facility]  Subscriber #:  [id number]  Authorization Number: [id number]    Admit date: 25  Admit time:  11:39 AM  Discharge Date: 2025  8:10 PM     Admitting Physician: Joseluis Rodriguez MD  Attending Physician:  No att. providers found  Primary Care Physician: No primary care provider on file.          Discharge Summary Notes        Discharge Summary signed by Rhonda Felix DO at 2025  2:14 PM       Author: Rhonda Felix DO Specialty: OBSTETRICS & GYNECOLOGY Author Type: Physician    Filed: 2025  2:14 PM Date of Service: 2/10/2025  9:46 AM Status: Addendum    : Rhonda Felix DO (Physician)    Related Notes: Original Note by Joseluis Rodriguez MD (Physician) filed at 2/10/2025  9:48 AM           [facility]  part of [facility]    Discharge Summary    Tommy Jim Patient Status:  Inpatient    10/14/1995 MRN [id number]   Location [facility] 3SE Attending Joseluis Rodriguez MD   Hosp Day # 4 PCP No primary care provider on file.     Date of Admission: 2025    Date of Discharge: 25     Admission Diagnoses: pregnancy  Pregnancy (HCC) at 37w3d   Severe pre-eclampsia    Secondary Diagnosis:   Patient Active Problem List   Diagnosis    Encntr for suprvsn of normal first pregnancy, unsp trimester (HCC)    Carrier of Congenital Adrenal Hyperplasia, [id number]-bttaiml Alpha Thalassemia, HBA1/HBA2-related; partner is negative    Elevated blood pressure affecting pregnancy in third trimester, antepartum (HCC)    Pregnancy (HCC)    Arrest of descent, delivered, current hospitalization (HCC)     delivery delivered (HCC)        Primary OB Clinician: [facility]    Hospital Course:     EDC: Estimated Date of Delivery: 25    Gestational Age: 37w3d    Date of Delivery: 2025    Antepartum complications: none    Delivered By: Dr. Ольга Nicholas    Delivery Type: Primary  section 2/2 arrest of descent.     Tubal  Ligation: n/a    Baby: Liveborn female,     Apgars:  1 minute:   9                 5 minutes: 9                            Anesthesia: epidural      Surgical Procedures       Case IDs Date Procedure Surgeon Location Status    [id number] 25  SECTION Ольга Nicholas MD EM L+D OR Comp            Intrapartum Complications: Arrest of descent.     Laceration: n/a    Episiotomy: none    Placenta: manual removal    Feeding Method: bottle fed     Rh Immune Globulin Given: no    Rubella Vaccine Given: no        Discharge Plan:   Discharge Condition: Good  Early Discharge:  NO    Discharge medications:  Current Discharge Medication List        New Orders    Details   ibuprofen 600 MG Oral Tab Take 1 tablet (600 mg total) by mouth every 6 (six) hours as needed for Pain.      acetaminophen 325 MG Oral Tab Take 1 tablet (325 mg total) by mouth every 6 (six) hours as needed for Pain.      docusate sodium 100 MG Oral Cap Take 1 capsule (100 mg total) by mouth 2 (two) times daily as needed.      polyethylene glycol, PEG 3350, 17 g Oral Powd Pack Take 17 g by mouth 2 (two) times daily as needed.      gabapentin 300 MG Oral Cap Take 1 capsule (300 mg total) by mouth in the morning, at noon, and at bedtime for 7 days.           Home Meds - Unchanged    Details   prenatal vitamin with DHA 27-0.8-228 MG Oral Cap Take 1 capsule by mouth daily.      hydrocortisone 2.5 % External Cream Place 1 Application rectally daily.      famotidine (PEPCID AC MAXIMUM STRENGTH) 20 MG Oral Tab Take 1 tablet (20 mg total) by mouth 2 (two) times daily as needed for Heartburn.      hydrocortisone 2.5 % External Cream Place rectally.      hydrocortisone 25 MG Rectal Suppos Place 1 suppository (25 mg total) rectally 3 (three) times daily as needed.      Wheat Dextrin (BENEFIBER) Oral Powder Take 3 g by mouth daily.                   Discharge Diet: As tolerated and General diet    Discharge Activity: Pelvic rest until cleared    Follow up:       Follow-up Information       Ольга Nicholas MD. Schedule an appointment as soon as possible for a visit in 2 week(s).    Specialty: OBSTETRICS & GYNECOLOGY  Why: postop visit  Contact information:  [address] [facility] [address]  [facility] [id number] [phone]               Ольга Nicholas MD Follow up in 6 week(s).    Specialty: OBSTETRICS & GYNECOLOGY  Why: Post Partum appointment  Contact information:  [address] [facility] [address]  [facility] [id number] [phone]                             Follow up Labs: None      Other Discharge Instructions:           Post  Section Home Care Instructions     We hope you were pleased with your care at [facility].  We wish you the best outcome and overall experience with the delivery of your baby.  These instructions will help to minimize pain, limit the risk for an infection, and improve the likelihood of a successful recovery.    What to Expect:  Abdominal cramping after delivery especially if you are breastfeeding.   Vaginal bleeding for about 4-6 weeks that may be followed by a yellow or white discharge for a few more weeks.  Your period will resume in approximately 6-8 weeks, unless you are breastfeeding.    If you are bottle feeding, you may notice breast engorgement in about 3 days.  Your breast may be sore and hard. Please wear a tight fitted bra or sports bra for 24-36 hours to help prevent your breast from producing milk, and use ice packs to relive any discomfort.  If you are breastfeeding, nipple dryness is very common the first few days.    Constipation is common after having a baby.  Please increase fluid and fiber in your diet.      Prescribed medication:  - prescriptions for gabapentin and ibuprofen were sent to your preferred pharmacy.   - take the gabapentin every 8 hours for the next 3-4 days, then you can back off and take only as needed for the rest of the week.  - take the ibuprofen every 8 hours for the next 3-4 days (alternate  with the gabapentin), then you can back off and take only as needed for pain while you continue healing.  - nifedipine 30 mg was prescribed to lower your blood pressure. Take this medicine every day in the morning.    Check your blood pressure daily - around the same time each day.  - if you have a headache that doesn't go away, or feel \"off\" check your blood pressure.  - if your BP is higher than 160/110, call our office [phone].    Over-The-Counter Medication  Non-prescription anti-inflammatory medications can also help to ease the pain.  You may take Tylenol  Colace or Metamucil for Constipation  Lanolin for dry nipples  Tucks, Witch Hazel and Epifoam for vaginal/perineum discomfort.   Drink a full glass of water with oral medication and take as directed.    Wound Care  The following instructions will promote proper healing and help to prevent infection  Please use soap and water over incision   Pat your incision dry and leave open to air if possible   There are stitches below your skin and a clear sticker over your skin. You can remove the clear sticker in 1 week if you would like. Otherwise we will removed it in 2 weeks at your incision check appointment.    Bathing/Showers  You may resume showers  No baths, swimming, hot tubs until your post-partum visit    Home Medication  Resume your home medications as instructed    Diet  Resume your normal diet    Activity  Refrain from vaginal intercourse, vaginal suppositories, tampon use or douches until after your post-partum visit  No exercising for 4 weeks  You may climb stairs minimally for the 1st week.    Do not do heavy housework for at least 2-3 weeks    Return to Work or School  You may return to work in 6-8 weeks  Contact your obstetrician’s office, if you need a medical release. ([phone])    Driving  Avoid driving for 1-2 weeks or sooner if not taking narcotics.    Follow-up Appointment with Your Obstetrician  Call your obstetrician’s office today  for an appointment in four weeks.    The number is [phone].  Verify your appointment date, day, time, and location.  At your 1st post-partum office visit:  Your progress will be evaluated, findings reviewed, and any additional concerns and instructions will be discussed.    Questions or Concerns  Call your obstetrician’s office if you experience the following:  Severe pain not controlled by pain medication  Foul smelling vaginal discharge  Heavy bleeding  Shortness of breath  Fever  Redness, increased swelling or drainage from your incision  Crying and periods of sadness that prevents you from caring for yourself and your baby  Burning sensation during urination or inability to urinate  Swelling, redness or abnormal warmth to your leg/calf  Please call [phone]. If your call is made after office hours, a physician will be available to help you.  There is always a provider covering our patients.    Thank you for coming to [facility] to start your new family.  The nurses, obstetricians, and the anesthesiologists try very hard to make sure you receive the best care possible.  Your trust in them as well as us is greatly appreciated.    Thanks so much,   The Providers of [facility] Obstetrics and Gynecology   Psychiatry Referrals  [facility] (counseling and medication management)  Locations: [facility]   [phone]   If you are experiencing a mental health crisis, please call 911 or go to your nearest ED. If you are not in immediate danger but require emotional support and/or assistance, please contact: [facility] at [phone] or [facility] at [phone].                Dr. Joseluis Rodriguez MD    [facility] 10 OBGYN     This note was created by Sanguine voice recognition. Errors in content may be related to improper recognition by the system; efforts to review and correct have been done but errors may still  exist. Please be advised the primary purpose of this note is for me to communicate medical care. Standard sentence structure is not always used. Medical terminology and medical abbreviations may be used. There may be grammatical, typographical, and automated fill ins that may have errors missed in proofreading.       Electronically signed by Rhonda Felix DO on 2/11/2025  2:14 PM         REVIEWER COMMENTS

## 2025-02-14 ENCOUNTER — NURSE ONLY (OUTPATIENT)
Dept: OBGYN CLINIC | Facility: CLINIC | Age: 30
End: 2025-02-14
Payer: COMMERCIAL

## 2025-02-14 VITALS — DIASTOLIC BLOOD PRESSURE: 82 MMHG | SYSTOLIC BLOOD PRESSURE: 136 MMHG

## 2025-02-14 PROCEDURE — 3075F SYST BP GE 130 - 139MM HG: CPT | Performed by: OBSTETRICS & GYNECOLOGY

## 2025-02-14 PROCEDURE — 3079F DIAST BP 80-89 MM HG: CPT | Performed by: OBSTETRICS & GYNECOLOGY

## 2025-02-14 NOTE — PROGRESS NOTES
Pt presented for BP check. Name and  verified. Pt delivered via c/s on 25 Pt reports occasional minor headaches, but tolerated with medication. BP readings were 136/82 (R arm). Patient reported at home BP readings were 130/90s.    Pt aware of preeclampsia warnings signs. RN told pt to call the office if she experiences severe headache not helped by Tylenol, visual disturbances, or right upper quadrant pain. Pt verbalized understanding and agreed with plan of care.

## 2025-03-03 ENCOUNTER — ROUTINE PRENATAL (OUTPATIENT)
Dept: OBGYN CLINIC | Facility: CLINIC | Age: 30
End: 2025-03-03
Payer: COMMERCIAL

## 2025-03-03 VITALS
SYSTOLIC BLOOD PRESSURE: 130 MMHG | HEIGHT: 67 IN | BODY MASS INDEX: 29.82 KG/M2 | DIASTOLIC BLOOD PRESSURE: 70 MMHG | WEIGHT: 190 LBS

## 2025-03-03 DIAGNOSIS — B00.1 COLD SORE: Primary | ICD-10-CM

## 2025-03-03 PROBLEM — O14.10 SEVERE PRE-ECLAMPSIA, ANTEPARTUM (HCC): Chronic | Status: ACTIVE | Noted: 2025-02-11

## 2025-03-03 RX ORDER — PENCICLOVIR 10 MG/G
1 CREAM TOPICAL
Qty: 5 G | Refills: 1 | Status: SHIPPED | OUTPATIENT
Start: 2025-03-03 | End: 2025-03-10

## 2025-03-03 NOTE — PROGRESS NOTES
CC: Patient is here for wound check up    HPI: Patient is a 29 year old  for above Had c/s for FTP and PP hypertension and started on nifedipine.     Also c/o cold sore on upper lip. Wound like treatment.     Baby was readmitted for 5 days due to seizure activity. EEG and MRI were normal per pt report. Baby is home on Kepra    Started on Zoloft due to anxiety. She feels like her mental health is better.     Patient's last menstrual period was 05/15/2024.    OB History    Para Term  AB Living   1 1 1 0 0 1   SAB IAB Ectopic Multiple Live Births   0 0 0 0 1      # Outcome Date GA Lbr Gume/2nd Weight Sex Type Anes PTL Lv   1 Term 25 37w3d 06:33 / 04:12 8 lb 5.3 oz (3.78 kg) F Caesarean EPI Y LUCIEN      Complications: Late decelerations, Failure to Progress in Second Stage       GYN hx:       LPS:      Past Medical History:    Anxiety    Depression     Past Surgical History:   Procedure Laterality Date           Allergies[1]  Family History   Problem Relation Age of Onset    Diabetes Father     Hypertension Father     Hypertension Mother     Hyperlipidemia Mother     Diabetes Mother     No Known Problems Maternal Grandmother     No Known Problems Maternal Grandfather     No Known Problems Paternal Grandmother     No Known Problems Paternal Grandfather     No Known Problems Brother     Depression Sister      Social History     Socioeconomic History    Marital status:      Spouse name: Not on file    Number of children: Not on file    Years of education: Not on file    Highest education level: Not on file   Occupational History    Not on file   Tobacco Use    Smoking status: Never    Smokeless tobacco: Never   Substance and Sexual Activity    Alcohol use: Not Currently    Drug use: Never    Sexual activity: Yes   Other Topics Concern     Service Not Asked    Blood Transfusions No    Caffeine Concern Not Asked    Occupational Exposure Not Asked    Hobby Hazards Not Asked     Sleep Concern Not Asked    Stress Concern Not Asked    Weight Concern Not Asked    Special Diet Not Asked    Back Care Not Asked    Exercise Not Asked    Bike Helmet Not Asked    Seat Belt Not Asked    Self-Exams Not Asked   Social History Narrative    Feel safe     Social Drivers of Health     Food Insecurity: No Food Insecurity (2025)    NCSS - Food Insecurity     Worried About Running Out of Food in the Last Year: No     Ran Out of Food in the Last Year: No   Transportation Needs: No Transportation Needs (2025)    NCSS - Transportation     Lack of Transportation: No   Stress: No Stress Concern Present (2025)    Stress     Feeling of Stress : No   Housing Stability: Not At Risk (2025)    NCSS - Housing/Utilities     Has Housing: Yes     Worried About Losing Housing: No     Unable to Get Utilities: No       Medications reviewed. See active list.     /70   Ht 67\"   Wt 190 lb (86.2 kg)   LMP 05/15/2024   BMI 29.76 kg/m²       Exam:   GENERAL: well developed, well nourished, in no apparent distress  ABDOMEN: Soft, non distended; non tender, no masses.  Wound cdi      A/P: Patient is 29 year old female     1. Cold sore  - Penciclovir 1 % External Cream; Apply 1 Application topically every 2 (two) hours for 7 days.  Dispense: 5 g; Refill: 1    2. Postpartum examination following  delivery (HCC)    3. Postpartum hypertension (HCC)    Recommend stop nifedipine 72 hours prior to 6 week check up.     Syl Patel MD                [1] No Known Allergies

## 2025-03-08 ENCOUNTER — TELEPHONE (OUTPATIENT)
Dept: OBGYN UNIT | Facility: HOSPITAL | Age: 30
End: 2025-03-08

## 2025-03-25 ENCOUNTER — POSTPARTUM (OUTPATIENT)
Dept: OBGYN CLINIC | Facility: CLINIC | Age: 30
End: 2025-03-25
Payer: COMMERCIAL

## 2025-03-25 VITALS
HEIGHT: 67 IN | SYSTOLIC BLOOD PRESSURE: 132 MMHG | DIASTOLIC BLOOD PRESSURE: 90 MMHG | BODY MASS INDEX: 30.45 KG/M2 | WEIGHT: 194 LBS

## 2025-03-25 PROCEDURE — 3008F BODY MASS INDEX DOCD: CPT | Performed by: OBSTETRICS & GYNECOLOGY

## 2025-03-25 PROCEDURE — 3080F DIAST BP >= 90 MM HG: CPT | Performed by: OBSTETRICS & GYNECOLOGY

## 2025-03-25 PROCEDURE — 3075F SYST BP GE 130 - 139MM HG: CPT | Performed by: OBSTETRICS & GYNECOLOGY

## 2025-03-25 NOTE — PROGRESS NOTES
[facility]  Obstetrics and Gynecology   Postpartum Progress Note    Subjective:     Tommy Jim is a 29 year old  female who is s/p CS on 25 for failure to progress. Her pregnancy was complicated by  ctx without pre-term delivery, and by severe pre-eclampsia. She reports that she has a lot on her plate right now.    Daugher has epilepsy. Has required a lot of appointments / follow-up.    Her mood is horrible, is crying all the time. Does not feel that she is handling this very well.    Bleeding resolved.    Had sex no problems    Still taking nifedipine - f/u PCP in 1-2 months      Review of Systems:  General:  denies fevers, chills, fatigue and malaise.   Respiratory:  denies SOB, dyspnea, cough or wheezing  Cardiovascular:  denies chest pain, palpitations, exercise intolerance   GI: denies abdominal pain, diarrhea, constipation  :  denies dysuria, hematuria, increased urinary frequency. denies abnormal uterine bleeding or vaginal discharge.       Objective:     Vitals:    25 1317   BP: 132/90   Weight: 194 lb (88 kg)   Height: 67\"         Body mass index is 30.38 kg/m².    GENERAL: well developed, well nourished, in no apparent distress, alert and orientated X 3  PSYCH: mood and affect stable   SKIN: no rashes, no lesions  HEENT: normal  LUNGS: respiration unlabored  CARDIOVASCULAR: no peripheral edema or varicosities, skin warm and dry  ABDOMEN: Soft, non distended; non tender, no masses  GYNE/:   External Genitalia: normal, no lesions, good perineal support  Urethra: meatus normal   Bladder: well supported  Vagina: normal mucosa, no lesions,  discharge absent  Uterus: normal size, mobile, nontender  Cervix: normal os, no lesions or bleeding  Adnexa:normal size, bilaterally nontender, no palpable masses  Cul-de-sac: normal  R/V: normal perineum, no hemorrhoids  EXTREMITIES:  Nontender without edema      Assessment:     Tommy Jim is a 29 year old  female  who presents for postpartum visit   Patient Active Problem List   Diagnosis    Carrier of Congenital Adrenal Hyperplasia, [id number]-weizter Alpha Thalassemia, HBA1/HBA2-related; partner is negative     delivery delivered (HCC)    Severe pre-eclampsia, antepartum (HCC)    Postpartum hypertension (HCC)    Postpartum examination following  delivery (HCC)         Plan:     Postpartum exam   - no abnormal findings on physical exam   - may return to normal activity     Contraception counseling   - discussion held with patient about family planning and contraception  - pt desires condoms / vcf    Postpartum depression  - will increase dose of zoloft to 50 mg. Have room to increase more if necessary.  - letter given for disability leave re: depression, may need official forms and/or more info from her baby's neurologist, but this letter can be the start for her.      Medications    sertraline 50 MG Oral Tab     .     All of the findings and plan were discussed with the patient.  She notes understanding and agrees with the plan of care.  All questions were answered to the best of my ability at this time.    RTC 3 months f/u or sooner if needed     Rhonda Felix, DO

## 2025-03-28 ENCOUNTER — PATIENT MESSAGE (OUTPATIENT)
Dept: OBGYN CLINIC | Facility: CLINIC | Age: 30
End: 2025-03-28

## 2025-04-04 ENCOUNTER — TELEPHONE (OUTPATIENT)
Dept: OBGYN CLINIC | Facility: CLINIC | Age: 30
End: 2025-04-04

## 2025-04-09 NOTE — TELEPHONE ENCOUNTER
Called patient and LMTCB - Need details for Disab forms - please task MD when patient calls back to provide details - forms are due to Post partum depression (PPD) and patient has not f/u on file. There is a letter for patient to be off work due PPD and also do to daughter medical complication - if forms are to care for daughter patient may need forms completed by daughter PCP or patient PCP

## 2025-04-09 NOTE — TELEPHONE ENCOUNTER
Patient returned call- Confirmed with patient if leave was due to depression and daughter care- patient confirmed I explained to patient that I will need to discuss leave with OBGYNE MD but MD may suggest forms to be completed by  MD / PCP or patient daughter PCP - verbal understanding from patient - advised patient to schedule an appt with OBGYN since we need an end date to leave. Verbal understanding from patient she will schedule appt       Type of Leave: Disab - Continuous   Reason for Leave: Depression / daughter care  Start date of leave: 04/01/25   End date of leave: pending re eval - patient will schedule re eval after call- will task MD when I see appt on file

## 2025-04-11 NOTE — TELEPHONE ENCOUNTER
Dr Felix,    Patient is requesting for disability due to depression and to care for daughter due to daughter medical complications of epilepsy. I advised the patient that she may need to continue care with  physician for depression symptoms. The patient also mentioned she needs time off to care for daughter due to epilepsy, I did advise patient she may also need forms to be completed by daughter's PCP or treating physician since she is continuing care with other doctors, not with Dr Felix. The patient mentioned she was provided a letter from your office approving more time off. I see the letter did explain additional time off needed until re eval but there is no pending appts. Can you please advise if you will continue to have patient off work and for how long. The patient is requesting for disability to start 04/01/25.     Thank you,    Monica

## 2025-04-17 ENCOUNTER — TELEPHONE (OUTPATIENT)
Dept: OBGYN CLINIC | Facility: CLINIC | Age: 30
End: 2025-04-17

## 2025-04-17 RX ORDER — NIFEDIPINE 30 MG
30 TABLET, EXTENDED RELEASE ORAL DAILY
Qty: 30 TABLET | Refills: 0 | Status: SHIPPED | OUTPATIENT
Start: 2025-04-17 | End: 2025-04-17

## 2025-04-17 NOTE — TELEPHONE ENCOUNTER
Dr. Felix      Please sign off on form if you agree to: Disabiity  Start: 04/01/25 End: 05/06/25    (place your signature on the first page only)    -From your Inbasket, Highlight the patient and click Chart   -Double click the 04/17/25 Forms Completion telephone encounter  -Scroll down to the Media section   -Click the blue Hyperlink: Emma Felix 04/17/25  -Click Acknowledge located in the top right ribbon/menu   -Drag the mouse into the blank space of the document and a + sign will appear. Left click to   electronically sign the document.     Thank you,    Monica MCGUIRE

## 2025-04-17 NOTE — TELEPHONE ENCOUNTER
Call transferred from Forms, explained pt is past post partum extend FMLA until she sees her PCP on 05/06/2025.  Explained to Forms Department Monica, pt still needs to discuss with her employer any extensions ect.  Dr Felix informed and agrees.    Called spouse and pt answered speaking with forms will call back.    Pt called back crying and stressed, explained FMLA process.  Current plan:    FMLA EXTENDED until 05/06/2025 to see Dr. Guzman that day.    Bring all forms to Dr. Guzman, bp med review provided 30 days supply.  Call Efren Brown    Pt agrees.    Due to pt crying and anxious, spoke with Dr. Falcon/Dr. Guzman to get her in sooner.  Pt scheduled today at [place].  Called pt and informed agrees.

## 2025-04-17 NOTE — TELEPHONE ENCOUNTER
RN spoke with Dr. Felix who stated that pt does not need a return to work date since pt is applying for disability. It will be dependent on what her employer authorizes on duration. Provider stated, Pt should follow up with a PCP or behavior health for management of postpartum depression for continued evaluation. Her appt scheduled for 6/23/25 is a routine follow up. RN called patient and left voicemail to call office to explain further

## 2025-04-17 NOTE — TELEPHONE ENCOUNTER
Hi Forms Team. I just saw patient today and will be working with her regarding the postpartum depression. We discussed extending the RTW date to 8/4/25. Can we please update the disability paperwork to state this date and also input my name/contact info instead of Dr. Felix's? Please forward back to me and I will be happy to sign once ready. Thank you all.

## 2025-04-30 ENCOUNTER — TELEPHONE (OUTPATIENT)
Facility: CLINIC | Age: 30
End: 2025-04-30

## 2025-04-30 NOTE — TELEPHONE ENCOUNTER
Patient calling to state  is on his way to  disability forms. States  is headed to [employer] [facility] [employer]. She is authorizing him to  forms on her behalf.

## 2025-06-20 DIAGNOSIS — B00.1 COLD SORE: ICD-10-CM

## 2025-06-23 RX ORDER — PENCICLOVIR 10 MG/G
1 CREAM TOPICAL
Qty: 5 G | Refills: 0 | Status: SHIPPED | OUTPATIENT
Start: 2025-06-23 | End: 2025-06-30

## (undated) NOTE — LETTER
12/09/24    To Whom It May Concern,     Tommy Jim is currently under our care for her pregnancy. Due to her pregnancy related health conditions she may need a day or 2 off of work about once a month.       Sincerely,          Dr. Joseluis Rodriguez MD    [facility] OBGYN     This note was created by [employer] voice recognition. Errors in content may be related to improper recognition by the system; efforts to review and correct have been done but errors may still exist. Please be advised the primary purpose of this note is for me to communicate medical care. Standard sentence structure is not always used. Medical terminology and medical abbreviations may be used. There may be grammatical, typographical, and automated fill ins that may have errors missed in proofreading.

## (undated) NOTE — LETTER
03/25/25    To Whom It May Concern,  I saw Tommy Jim in the office today for follow-up appointment.    She now has a diagnosis of postpartum depression that we are just beginning to manage. And, her daughter has medical complications that require a lot of her time for management and doctors' appointments.    Because of these 2 reasons, Tommy should qualify for disability leave.     Please call me with any questions or concerns.    Sincerely,        Rhonda Felix, DO

## (undated) NOTE — LETTER
24    RE: Tommy Jim    : 10/14/1995    To Whom It May Concern:    Our patient, Tommy Jim,      _____Has received treatment in our office on ___________________________.        _____Has been hospitalized on the following dates ______________________.       _____ Has been ill and unable to work from _____________________________.        _____ May resume work / school on ___________________________________.      _____ May resume work / school with the following restrictions ______________    __________________________________________________________.      _____ May participate in physical education.      _____ May participate in a prenatal exercise class / yoga class.      _____ Patient is pregnant with a due date of Estimated Date of Delivery: 25      ____x_ Other The above patient is pregnant and is having pain with walking. She would benefit from minimizing her walking while working, or limiti it to less than an hour in a day. ___________________________________________________     __________________________________________________________       Sincerely Syl leblanc MD

## (undated) NOTE — LETTER
[facility]  Administration of Anesthesia  Tommy MATHIS agree to be cared for by a physician anesthesiologist alone and/or with a nurse anesthetist, who is specially trained to monitor me and give me medicine to put me to sleep or keep me comfortable during my procedure    I understand that my anesthesiologist and/or anesthetist is not an employee or agent of [facility] or [employer]. He or she works for [facility]    As the patient asking for anesthesia services, I agree to:  Allow the anesthesiologist (anesthesia doctor) to give me medicine and do additional procedures as necessary. Some examples are: Starting or using an “IV” to give me medicine, fluids or blood during my procedure, and having a breathing tube placed to help me breathe when I’m asleep (intubation). In the event that my heart stops working properly, I understand that my anesthesiologist will make every effort to sustain my life, unless otherwise directed by [facility] Do Not Resuscitate documents.  Tell my anesthesia doctor before my procedure:  If I am pregnant.  The last time that I ate or drank.  iii. All of the medicines I take (including prescriptions, herbal supplements, and pills I can buy without a prescription (including street drugs/illegal medications). Failure to inform my anesthesiologist about these medicines may increase my risk of anesthetic complications.  iv.If I am allergic to anything or have had a reaction to anesthesia before.  I understand how the anesthesia medicine will help me (benefits).  I understand that with any type of anesthesia medicine there are risks:  The most common risks are: nausea, vomiting, sore throat, muscle soreness, damage to my eyes, mouth, or teeth (from breathing tube placement).  Rare risks include: remembering what happened during my procedure, allergic reactions to medications, injury to my airway, heart, lungs, vision, nerves, or muscles  and in extremely rare instances death.  My doctor has explained to me other choices available to me for my care (alternatives).  Pregnant Patients (“epidural”):  I understand that the risks of having an epidural (medicine given into my back to help control pain during labor), include itching, low blood pressure, difficulty urinating, headache or slowing of the baby’s heart. Very rare risks include infection, bleeding, seizure, irregular heart rhythms and nerve injury.  Regional Anesthesia (“spinal”, “epidural”, & “nerve blocks”):  I understand that rare but potential complications include headache, bleeding, infection, seizure, irregular heart rhythms, and nerve injury.    _____________________________________________________________________________  Patient (or Representative) Signature/Relationship to Patient  Date   Time    _____________________________________________________________________________   Name (if used)    Language/Organization   Time    _____________________________________________________________________________  Nurse Anesthetist Signature     Date   Time  _____________________________________________________________________________  Anesthesiologist Signature     Date   Time  I have discussed the procedure and information above with the patient (or patient’s representative) and answered their questions. The patient or their representative has agreed to have anesthesia services.    _____________________________________________________________________________  Witness        Date   Time  I have verified that the signature is that of the patient or patient’s representative, and that it was signed before the procedure  Patient Name: Tommy Jim     : 10/14/1995                 Printed: 2025 at 11:40 AM    Medical Record #: [id number]                                            Page 1 of 1  ----------ANESTHESIA CONSENT----------